# Patient Record
Sex: FEMALE | Race: WHITE | Employment: FULL TIME | URBAN - METROPOLITAN AREA
[De-identification: names, ages, dates, MRNs, and addresses within clinical notes are randomized per-mention and may not be internally consistent; named-entity substitution may affect disease eponyms.]

---

## 2018-11-01 LAB
A/G RATIO: 1.3 (ref 1.1–2.2)
ALBUMIN SERPL-MCNC: 4.3 G/DL (ref 3.4–5)
ALP BLD-CCNC: 64 U/L (ref 40–129)
ALT SERPL-CCNC: 15 U/L (ref 10–40)
ANION GAP SERPL CALCULATED.3IONS-SCNC: 14 MMOL/L (ref 3–16)
AST SERPL-CCNC: 20 U/L (ref 15–37)
BILIRUB SERPL-MCNC: 0.3 MG/DL (ref 0–1)
BUN BLDV-MCNC: 12 MG/DL (ref 7–20)
CALCIUM SERPL-MCNC: 9.5 MG/DL (ref 8.3–10.6)
CHLORIDE BLD-SCNC: 100 MMOL/L (ref 99–110)
CO2: 26 MMOL/L (ref 21–32)
CREAT SERPL-MCNC: 0.5 MG/DL (ref 0.6–1.1)
GFR AFRICAN AMERICAN: >60
GFR NON-AFRICAN AMERICAN: >60
GLOBULIN: 3.4 G/DL
GLUCOSE BLD-MCNC: 70 MG/DL (ref 70–99)
POTASSIUM SERPL-SCNC: 4.6 MMOL/L (ref 3.5–5.1)
SODIUM BLD-SCNC: 140 MMOL/L (ref 136–145)
TOTAL PROTEIN: 7.7 G/DL (ref 6.4–8.2)

## 2018-11-04 LAB — MISCELLANEOUS LAB TEST ORDER: NORMAL

## 2018-11-07 LAB — CALPROTECTIN: 536 UG/G

## 2019-02-12 LAB
QUANTI TB GOLD PLUS: NEGATIVE
QUANTI TB1 MINUS NIL: 0 IU/ML (ref 0–0.34)
QUANTI TB2 MINUS NIL: 0 IU/ML (ref 0–0.34)
QUANTIFERON MITOGEN: >10 IU/ML
QUANTIFERON NIL: 0.07 IU/ML

## 2019-09-27 RX ORDER — LISINOPRIL 20 MG/1
20 TABLET ORAL DAILY
COMMUNITY

## 2019-09-27 RX ORDER — NORGESTIMATE AND ETHINYL ESTRADIOL 7DAYSX3 28
KIT ORAL
COMMUNITY
End: 2022-05-17

## 2019-10-02 ENCOUNTER — ANESTHESIA EVENT (OUTPATIENT)
Dept: ENDOSCOPY | Age: 39
End: 2019-10-02
Payer: COMMERCIAL

## 2019-10-04 ENCOUNTER — ANESTHESIA (OUTPATIENT)
Dept: ENDOSCOPY | Age: 39
End: 2019-10-04
Payer: COMMERCIAL

## 2019-10-04 ENCOUNTER — HOSPITAL ENCOUNTER (OUTPATIENT)
Age: 39
Setting detail: OUTPATIENT SURGERY
Discharge: HOME | End: 2019-10-04
Attending: INTERNAL MEDICINE | Admitting: INTERNAL MEDICINE
Payer: COMMERCIAL

## 2019-10-04 VITALS
SYSTOLIC BLOOD PRESSURE: 114 MMHG | HEIGHT: 66 IN | HEART RATE: 78 BPM | OXYGEN SATURATION: 100 % | RESPIRATION RATE: 16 BRPM | WEIGHT: 129.8 LBS | DIASTOLIC BLOOD PRESSURE: 90 MMHG | TEMPERATURE: 97.4 F | BODY MASS INDEX: 20.86 KG/M2

## 2019-10-04 VITALS — SYSTOLIC BLOOD PRESSURE: 114 MMHG | OXYGEN SATURATION: 99 % | DIASTOLIC BLOOD PRESSURE: 76 MMHG

## 2019-10-04 DIAGNOSIS — K50.919 CROHN'S DISEASE WITH COMPLICATION, UNSPECIFIED GASTROINTESTINAL TRACT LOCATION (HCC): ICD-10-CM

## 2019-10-04 LAB — PREGNANCY, URINE: NEGATIVE

## 2019-10-04 PROCEDURE — 2500000003 HC RX 250 WO HCPCS: Performed by: NURSE ANESTHETIST, CERTIFIED REGISTERED

## 2019-10-04 PROCEDURE — 7100000011 HC PHASE II RECOVERY - ADDTL 15 MIN: Performed by: INTERNAL MEDICINE

## 2019-10-04 PROCEDURE — 84703 CHORIONIC GONADOTROPIN ASSAY: CPT

## 2019-10-04 PROCEDURE — 2709999900 HC NON-CHARGEABLE SUPPLY: Performed by: INTERNAL MEDICINE

## 2019-10-04 PROCEDURE — 7100000010 HC PHASE II RECOVERY - FIRST 15 MIN: Performed by: INTERNAL MEDICINE

## 2019-10-04 PROCEDURE — 88305 TISSUE EXAM BY PATHOLOGIST: CPT

## 2019-10-04 PROCEDURE — 3700000000 HC ANESTHESIA ATTENDED CARE: Performed by: INTERNAL MEDICINE

## 2019-10-04 PROCEDURE — 2580000003 HC RX 258: Performed by: ANESTHESIOLOGY

## 2019-10-04 PROCEDURE — 6360000002 HC RX W HCPCS: Performed by: NURSE ANESTHETIST, CERTIFIED REGISTERED

## 2019-10-04 PROCEDURE — 3609010300 HC COLONOSCOPY W/BIOPSY SINGLE/MULTIPLE: Performed by: INTERNAL MEDICINE

## 2019-10-04 PROCEDURE — 3700000001 HC ADD 15 MINUTES (ANESTHESIA): Performed by: INTERNAL MEDICINE

## 2019-10-04 RX ORDER — SODIUM CHLORIDE 0.9 % (FLUSH) 0.9 %
10 SYRINGE (ML) INJECTION PRN
Status: DISCONTINUED | OUTPATIENT
Start: 2019-10-04 | End: 2019-10-04 | Stop reason: HOSPADM

## 2019-10-04 RX ORDER — SODIUM CHLORIDE 9 MG/ML
INJECTION, SOLUTION INTRAVENOUS CONTINUOUS
Status: DISCONTINUED | OUTPATIENT
Start: 2019-10-04 | End: 2019-10-04 | Stop reason: HOSPADM

## 2019-10-04 RX ORDER — SODIUM CHLORIDE 0.9 % (FLUSH) 0.9 %
10 SYRINGE (ML) INJECTION EVERY 12 HOURS SCHEDULED
Status: DISCONTINUED | OUTPATIENT
Start: 2019-10-04 | End: 2019-10-04 | Stop reason: HOSPADM

## 2019-10-04 RX ORDER — ONDANSETRON 2 MG/ML
4 INJECTION INTRAMUSCULAR; INTRAVENOUS
Status: DISCONTINUED | OUTPATIENT
Start: 2019-10-04 | End: 2019-10-04 | Stop reason: HOSPADM

## 2019-10-04 RX ORDER — PROPOFOL 10 MG/ML
INJECTION, EMULSION INTRAVENOUS PRN
Status: DISCONTINUED | OUTPATIENT
Start: 2019-10-04 | End: 2019-10-04 | Stop reason: SDUPTHER

## 2019-10-04 RX ORDER — LIDOCAINE HYDROCHLORIDE 20 MG/ML
INJECTION, SOLUTION EPIDURAL; INFILTRATION; INTRACAUDAL; PERINEURAL PRN
Status: DISCONTINUED | OUTPATIENT
Start: 2019-10-04 | End: 2019-10-04 | Stop reason: SDUPTHER

## 2019-10-04 RX ADMIN — PROPOFOL 20 MG: 10 INJECTION, EMULSION INTRAVENOUS at 10:32

## 2019-10-04 RX ADMIN — PROPOFOL 10 MG: 10 INJECTION, EMULSION INTRAVENOUS at 10:43

## 2019-10-04 RX ADMIN — PROPOFOL 40 MG: 10 INJECTION, EMULSION INTRAVENOUS at 10:38

## 2019-10-04 RX ADMIN — PROPOFOL 10 MG: 10 INJECTION, EMULSION INTRAVENOUS at 10:28

## 2019-10-04 RX ADMIN — PROPOFOL 30 MG: 10 INJECTION, EMULSION INTRAVENOUS at 10:41

## 2019-10-04 RX ADMIN — PROPOFOL 30 MG: 10 INJECTION, EMULSION INTRAVENOUS at 10:29

## 2019-10-04 RX ADMIN — SODIUM CHLORIDE: 0.9 INJECTION, SOLUTION INTRAVENOUS at 09:25

## 2019-10-04 RX ADMIN — LIDOCAINE HYDROCHLORIDE 60 MG: 20 INJECTION, SOLUTION EPIDURAL; INFILTRATION; INTRACAUDAL; PERINEURAL at 10:25

## 2019-10-04 RX ADMIN — PROPOFOL 20 MG: 10 INJECTION, EMULSION INTRAVENOUS at 10:26

## 2019-10-04 RX ADMIN — PROPOFOL 30 MG: 10 INJECTION, EMULSION INTRAVENOUS at 10:27

## 2019-10-04 RX ADMIN — PROPOFOL 20 MG: 10 INJECTION, EMULSION INTRAVENOUS at 10:44

## 2019-10-04 RX ADMIN — PROPOFOL 50 MG: 10 INJECTION, EMULSION INTRAVENOUS at 10:35

## 2019-10-04 RX ADMIN — PROPOFOL 60 MG: 10 INJECTION, EMULSION INTRAVENOUS at 10:25

## 2019-10-04 RX ADMIN — PROPOFOL 30 MG: 10 INJECTION, EMULSION INTRAVENOUS at 10:31

## 2019-10-04 ASSESSMENT — PAIN SCALES - GENERAL
PAINLEVEL_OUTOF10: 0

## 2019-10-04 ASSESSMENT — PAIN - FUNCTIONAL ASSESSMENT: PAIN_FUNCTIONAL_ASSESSMENT: 0-10

## 2019-11-01 LAB
A/G RATIO: 1 (ref 1.1–2.2)
ALBUMIN SERPL-MCNC: 4.1 G/DL (ref 3.4–5)
ALP BLD-CCNC: 67 U/L (ref 40–129)
ALT SERPL-CCNC: 7 U/L (ref 10–40)
ANION GAP SERPL CALCULATED.3IONS-SCNC: 15 MMOL/L (ref 3–16)
AST SERPL-CCNC: 20 U/L (ref 15–37)
BASOPHILS ABSOLUTE: 0.1 K/UL (ref 0–0.2)
BASOPHILS RELATIVE PERCENT: 1 %
BILIRUB SERPL-MCNC: <0.2 MG/DL (ref 0–1)
BUN BLDV-MCNC: 10 MG/DL (ref 7–20)
CALCIUM SERPL-MCNC: 9.5 MG/DL (ref 8.3–10.6)
CHLORIDE BLD-SCNC: 97 MMOL/L (ref 99–110)
CO2: 24 MMOL/L (ref 21–32)
CREAT SERPL-MCNC: 0.5 MG/DL (ref 0.6–1.1)
EOSINOPHILS ABSOLUTE: 0.2 K/UL (ref 0–0.6)
EOSINOPHILS RELATIVE PERCENT: 3.1 %
GFR AFRICAN AMERICAN: >60
GFR NON-AFRICAN AMERICAN: >60
GLOBULIN: 4 G/DL
GLUCOSE BLD-MCNC: 89 MG/DL (ref 70–99)
HCT VFR BLD CALC: 34 % (ref 36–48)
HEMOGLOBIN: 11.5 G/DL (ref 12–16)
LYMPHOCYTES ABSOLUTE: 2.3 K/UL (ref 1–5.1)
LYMPHOCYTES RELATIVE PERCENT: 33.4 %
MCH RBC QN AUTO: 30.8 PG (ref 26–34)
MCHC RBC AUTO-ENTMCNC: 33.8 G/DL (ref 31–36)
MCV RBC AUTO: 91.1 FL (ref 80–100)
MONOCYTES ABSOLUTE: 0.6 K/UL (ref 0–1.3)
MONOCYTES RELATIVE PERCENT: 8.5 %
NEUTROPHILS ABSOLUTE: 3.8 K/UL (ref 1.7–7.7)
NEUTROPHILS RELATIVE PERCENT: 54 %
PDW BLD-RTO: 12.8 % (ref 12.4–15.4)
PLATELET # BLD: 428 K/UL (ref 135–450)
PMV BLD AUTO: 6.7 FL (ref 5–10.5)
POTASSIUM SERPL-SCNC: 4.2 MMOL/L (ref 3.5–5.1)
RBC # BLD: 3.73 M/UL (ref 4–5.2)
SODIUM BLD-SCNC: 136 MMOL/L (ref 136–145)
TOTAL PROTEIN: 8.1 G/DL (ref 6.4–8.2)
WBC # BLD: 7 K/UL (ref 4–11)

## 2020-10-28 LAB
BASOPHILS ABSOLUTE: 0 K/UL (ref 0–0.2)
BASOPHILS RELATIVE PERCENT: 0.4 %
C-REACTIVE PROTEIN: 36.3 MG/L (ref 0–5.1)
CALCIUM SERPL-MCNC: 9.5 MG/DL (ref 8.3–10.6)
EOSINOPHILS ABSOLUTE: 0 K/UL (ref 0–0.6)
EOSINOPHILS RELATIVE PERCENT: 0.7 %
FOLATE: >20 NG/ML (ref 4.78–24.2)
HCT VFR BLD CALC: 35.3 % (ref 36–48)
HEMOGLOBIN: 11.8 G/DL (ref 12–16)
LYMPHOCYTES ABSOLUTE: 2 K/UL (ref 1–5.1)
LYMPHOCYTES RELATIVE PERCENT: 31.9 %
MAGNESIUM: 2.3 MG/DL (ref 1.8–2.4)
MCH RBC QN AUTO: 29.8 PG (ref 26–34)
MCHC RBC AUTO-ENTMCNC: 33.3 G/DL (ref 31–36)
MCV RBC AUTO: 89.4 FL (ref 80–100)
MONOCYTES ABSOLUTE: 0.5 K/UL (ref 0–1.3)
MONOCYTES RELATIVE PERCENT: 8 %
NEUTROPHILS ABSOLUTE: 3.7 K/UL (ref 1.7–7.7)
NEUTROPHILS RELATIVE PERCENT: 59 %
PDW BLD-RTO: 14.7 % (ref 12.4–15.4)
PLATELET # BLD: 461 K/UL (ref 135–450)
PMV BLD AUTO: 6.5 FL (ref 5–10.5)
RBC # BLD: 3.95 M/UL (ref 4–5.2)
VITAMIN B-12: 1120 PG/ML (ref 211–911)
WBC # BLD: 6.4 K/UL (ref 4–11)

## 2020-10-29 LAB — SEDIMENTATION RATE, ERYTHROCYTE: 103 MM/HR (ref 0–20)

## 2020-10-31 LAB — MISCELLANEOUS LAB TEST ORDER: NORMAL

## 2020-11-03 PROBLEM — K50.919 CROHN'S DISEASE WITH COMPLICATION (HCC): Status: ACTIVE | Noted: 2020-11-03

## 2020-11-03 RX ORDER — SODIUM CHLORIDE 0.9 % (FLUSH) 0.9 %
30 SYRINGE (ML) INJECTION ONCE
Status: CANCELLED | OUTPATIENT
Start: 2020-11-10

## 2020-11-03 RX ORDER — DIPHENHYDRAMINE HYDROCHLORIDE 50 MG/ML
50 INJECTION INTRAMUSCULAR; INTRAVENOUS ONCE
Status: CANCELLED | OUTPATIENT
Start: 2020-11-10

## 2020-11-03 RX ORDER — METHYLPREDNISOLONE SODIUM SUCCINATE 125 MG/2ML
125 INJECTION, POWDER, LYOPHILIZED, FOR SOLUTION INTRAMUSCULAR; INTRAVENOUS ONCE
Status: CANCELLED | OUTPATIENT
Start: 2020-11-10

## 2020-11-03 RX ORDER — SODIUM CHLORIDE 9 MG/ML
INJECTION, SOLUTION INTRAVENOUS CONTINUOUS
Status: CANCELLED | OUTPATIENT
Start: 2020-11-10

## 2020-11-03 RX ORDER — SODIUM CHLORIDE 0.9 % (FLUSH) 0.9 %
10 SYRINGE (ML) INJECTION PRN
Status: CANCELLED | OUTPATIENT
Start: 2020-11-10

## 2020-11-03 RX ORDER — EPINEPHRINE 1 MG/ML
0.3 INJECTION, SOLUTION, CONCENTRATE INTRAVENOUS PRN
Status: CANCELLED | OUTPATIENT
Start: 2020-11-10

## 2020-11-05 LAB — MISCELLANEOUS LAB TEST ORDER: NORMAL

## 2020-12-01 ENCOUNTER — HOSPITAL ENCOUNTER (OUTPATIENT)
Dept: PHYSICAL THERAPY | Age: 40
Setting detail: THERAPIES SERIES
Discharge: HOME | End: 2020-12-01
Payer: COMMERCIAL

## 2020-12-01 PROCEDURE — 97110 THERAPEUTIC EXERCISES: CPT

## 2020-12-01 PROCEDURE — 97530 THERAPEUTIC ACTIVITIES: CPT

## 2020-12-01 PROCEDURE — 97162 PT EVAL MOD COMPLEX 30 MIN: CPT

## 2020-12-01 NOTE — PLAN OF CARE
Outpatient Physical Therapy  [] [facility]    Phone: [phone]   Fax: [phone]   [] [facility]  Phone: [phone]              Fax: [phone]  [x] Francine Baca   Phone: [phone]   Fax: [phone]     To: Referring Practitioner: DAVID Hernandez-SAM      Patient: Jim Bonilla   : 1980   MRN: [id number]  Evaluation Date: 2020      Diagnosis Information:  · Diagnosis: Chronic Neck Pain M54.2, G89.29   · Treatment Diagnosis: Neck Pain, Muscle Imbalance     Physical Therapy Certification Form  Dear Matias Rao,  The following patient has been evaluated for physical therapy services and for therapy to continue, Medicare requires monthly physician review of the treatment plan. Please review the attached evaluation and/or summary of the patient's plan of care, and verify that you agree therapy should continue by signing the attached document and sending it back to our office. Plan of Care/Treatment to date:  [x] Therapeutic Exercise    [x] Modalities:  [x] Therapeutic Activity     [] Ultrasound  [] Electrical Stimulation  [] Gait Training      [] Cervical Traction [] Lumbar Traction  [] Neuromuscular Re-education    [] Cold/hotpack [] Iontophoresis   [x] Instruction in HEP     Other:  [x] Manual Therapy      []             [] Aquatic Therapy      []           ? Frequency/Duration:  # Days per week: [] 1 day # Weeks: [] 1 week [] 5 weeks     [x] 2 days? [] 2 weeks [x] 6 weeks     [] 3 days   [] 3 weeks [] 7 weeks     [] 4 days   [] 4 weeks [] 8 weeks    Rehab Potential: [] Excellent [x] Good [] Fair  [] Poor       Electronically signed by:  Teodoro Li, PT [id number]      If you have any questions or concerns, please don't hesitate to call.   Thank you for your referral.      Physician Signature:________________________________Date:__________________  By signing above, therapists plan is approved by physician

## 2020-12-01 NOTE — FLOWSHEET NOTE
Physical Therapy Daily Treatment Note  Date:  2020    Patient Name:  Serina Ellis    :  1980  MRN: [id number]    Restrictions/Precautions:  Restrictions/Precautions  Restrictions/Precautions: Fall Risk(Low)  Pertinent Medical History:      Medical/Treatment Diagnosis Information:  · Diagnosis: Chronic Neck Pain M54.2, G89.29  · Treatment Diagnosis: Neck Pain, Muscle Imbalance    Insurance/Certification information:  PT Insurance Information: [employer], allowed 60 PT visits per contract year, $40 copay  Physician Information:  Referring Practitioner: ILAN Alvarado  Plan of care signed (Y/N): Faxed 20    Visit# / total visits:    Pain level: 50     Functional Outcomes Measure:  Test: Neck Disability Index  Score:8 = 16% Disability (20)     Progress Note: [x]  Yes  []  No  Next due by: Visit #10      History of Injury:Patient reports she has had neck pain for several years; 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 Patient has difficulty sleeping due to pain and anxiety, has stiffness in am, as the day goes on pain increases. Patient is working from home, 9 hours per day, 5 days per week, tries to take a break every 1-2 hours. Patient reports pain begins to increase after 3-4 hours working, climbs through the day, and is pretty uncomfortable by the end of the day. Patient does stretches, walking 30 minutes on treadmill. Patient does get some relief, but not complete. \"When things get really tight I get numbness and weakness in my L hand, especially the little finger. \"  Patient reports she applies heat, then it does relax some.     Subjective:       Objective:   Observation:    Test measurements:      Exercises:  Exercise/Equipment Resistance/Repetitions Other comments                                                                            Other Therapeutic Activities:  12/1/20:  Discussed at length anatomy and biomechanics of the neck, muscle reeducation, motor recruitment. Home Exercise Program:  12/1/20:   Patient instructed in chin tucks, lower trap sets, forward bending, backward bending, rotation R and L, sidebending R and L; written instructions with pictures issued, patient able to demonstrate exercises. Manual Treatments:     Modalities:     Progression Towards Functional goals:  [] Patient is progressing as expected towards functional goals listed. [] Progression is slowed due to complexities listed. [] Progression has been slowed due to co-morbidities. [x] Plan just implemented, too soon to assess goals progression  [] Other:    Charges: Therapeutic Exercise:  [x] (31204) Provided verbal/tactile cueing for activities to restore or maintain strength, flexibility, endurance, ROM for improvements with self-care, mobility, lifting and ambulation. Neuromuscular Re-Education  [] (70951) Provided verbal/tactile cueing for activities to restore or maintain balance, coordination, kinesthetic sense, posture, motor skill, proprioception for self-care, mobility, lifting, and ambulation. Therapeutic Activities:    [x] (93399) Provided verbal/tactile cueing to address functional limitations related to loss of mobility, strength, balance, and coordination.      Gait Training:  [] (65010) Provided training and instruction to the patient for proper postural muscle recruitment and positioning with ambulation re-education     Home Exercise Program:    [] (20817) Reviewed/Progressed HEP activities related to strengthening, flexibility, endurance, ROM for functional self-care, mobility, lifting and ambulation   [] (73223) Reviewed/Progressed HEP activities related to improving balance, coordination, kinesthetic sense, posture, motor skill, proprioception for self-care, mobility, lifting, and ambulation      Manual Treatments:  Luann De Jesus / Lazarus Rash / Oscillations-Mobs: G-I, II, III, IV / Manipulation / MLD  [] (48919) Provided manual therapy to mobilize  soft tissue/joints/fluid for the purpose of modulating pain, promoting relaxation, increasing ROM, reducing/eliminating soft tissue swelling/inflammation/restriction, improving soft tissue extensibility and allowing for proper ROM for normal function with self- care, mobility, lifting and ambulation. Timed Code Treatment Minutes: 30   Total Treatment Minutes: 55     [] EVAL (LOW) 24176   [x] EVAL (MOD) 68926   [] EVAL (HIGH) 00688   [] RE-EVAL   [x] TE (95094) x     [] Aquatic (02437) x  [] NMR (46455)   x  [] Aquatic Group (75121) x  [] Manual (28824) x    [] Ultrasound (78171) x  [x] TA (13369) x  [] Mech Traction (81778)  [] Ionto (35979)           [] ES (un) (03567):   [] Vasopump (97209) [] Other:      Assessment  [x] Patient tolerated treatment well [] Patient limited by fatigue  [] Patient limited by pain  [] Patient limited by other medical complications  [] Other:     Prognosis: [x] Good [] Fair  [] Poor    Goals:    Short term goals  Time Frame for Short term goals: 4-6 weeks  Short term goal 1: Pain </= 0/10 at rest, 2/10 with activity  Short term goal 2: Patient able to demonstrate neck AROM WNL without symptoms  Short term goal 3: Patient able to sleep thru night without waking due to neck pain  Short term goal 4: Patient able to work entire day without increased symptoms  Short term goal 5: Patient independent with written home exercise program            Patient Requires Follow-up: [x] Yes  [] No    Plan:   [] Continue per plan of care [] Alter current plan (see comments)  [x] Plan of care initiated [] Hold pending MD visit [] Discharge  Note: If patient does not return for scheduled/ recommended follow up visits, this note will serve as a discharge from care along with most recent update on progress.     Plan for Next Session:  Initiate UE [place], Theraslide lat pull down, row, extension (yellow band), soft

## 2020-12-01 NOTE — PROGRESS NOTES
Physical Therapy  Initial Assessment  Date: 2020  Patient Name: Yaakov Bee  MRN: [id number]  : 1980     Treatment Diagnosis: Neck Pain, Muscle Imbalance    Restrictions  Restrictions/Precautions: Fall Risk(Low)    Subjective   Chart Reviewed: Yes  Patient assessed for rehabilitation services?: Yes  Referring Practitioner: ILAN David  Referral Date : 20  Diagnosis: Chronic Neck Pain M54.2, G89.29  PT Visit Information  Onset Date: (Several years)  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 Patient has difficulty sleeping due to pain and anxiety, has stiffness in am, as the day goes on pain increases. Patient is working from home, 9 hours per day, 5 days per week, tries to take a break every 1-2 hours. Patient reports pain begins to increase after 3-4 hours working, climbs through the day, and is pretty uncomfortable by the end of the day. Patient does stretches, walking 30 minutes on treadmill. Patient does get some relief, but not complete. \"When things get really tight I get numbness and weakness in my L hand, especially the little finger. \"  Patient reports she applies heat, then it does relax some.   Pain: (5/10 on average, ranges from 1-2/10 almost all the time, 8-9/10 after sitting at computer for too long)    Vision/Hearing  Vision: Within Functional Limits  Hearing: Within functional limits    Orientation  Overall Orientation Status: Within Normal Limits    Social/Functional History  Lives With: Significant other  Type of Home: House  Home Layout: Two level;Bed/Bath upstairs  Home Access: Stairs to enter without rails  Entrance Stairs - Number of Steps: 1  Bathroom Shower/Tub: Walk-in shower  Bathroom Toilet: Standard  ADL Assistance: Independent  Homemaking Assistance: Independent  Active : Yes  Occupation: Full time employment  Type of occupation:     Objective  Posture: Fair(slight forward head, rounded shoulders; able to correct with cues)  Palpation: tenderness and mod/max tightness noted L>R lev scap, scalenes, SCM, upper traps, rhomboids, C-T paraspinals  Body Mechanics: mild scapular winging L shoulder during ECC shoulder elevation  RUE AROM : WFL  LUE AROM : WFL  Spine  Cervical: Forward Bending, Backward Bending, Rotation L and Sidebending L WNL but reports \"tight\" at end range; Rotation R and Sidebending R decreased 10%    Strength Other Gross MMT B UEs 5/5, however Fair control of lower traps, neck retractors with isometric contraction and relaxation  Overall Sensation Status: WFL(B UEs intact to light touch)       Assessment   Conditions Requiring Skilled Therapeutic Intervention  Body structures, Functions, Activity limitations: Decreased functional mobility ; Decreased high-level IADLs;Decreased posture;Decreased endurance;Decreased ROM; Decreased strength; Increased pain  Assessment: Patient presents with decreased functional mobility consistent with chronic neck pain and muscle imbalance. patient would benefit from PT to increase functional mobility.     Prior Level of FunctioN:  Independent  Treatment Diagnosis: Neck Pain, Muscle Imbalance  Prognosis: Good  Decision Making: Medium Complexity  Activity Tolerance  Activity Tolerance: Patient Tolerated treatment well         Plan : Patient will be seen 1-3 times per week for 4-6 weeks  Current Treatment Recommendations: Strengthening, Patient/Caregiver Education & Training, ROM, Modalities, Neuromuscular Re-education, Pain Management, Endurance Training, Manual Therapy - Soft Tissue Mobilization, Home Exercise Program    OutComes Score  Neck Disability Index Raw Score: 8 (12/01/20 1187)    Goals  Short term goals  Time Frame for Short term goals: 4-6 weeks  Short term goal 1: Pain </= 0/10 at rest, 2/10 with activity  Short term goal 2: Patient able to demonstrate neck AROM WNL without symptoms  Short term goal 3: Patient able to sleep thru night without waking due to neck pain  Short term goal 4: Patient able to work entire day without increased symptoms  Short term goal 5: Patient independent with written home exercise program  Patient Goals   Patient goals :  \"Reduce pain\"       Therapy Time   Individual Concurrent Group Co-treatment   Time In 1450         Time Out 1550         Minutes 60         Timed Code Treatment Minutes: [address], [address] [facility]

## 2020-12-04 ENCOUNTER — HOSPITAL ENCOUNTER (OUTPATIENT)
Dept: PHYSICAL THERAPY | Age: 40
Setting detail: THERAPIES SERIES
Discharge: HOME | End: 2020-12-04
Payer: COMMERCIAL

## 2020-12-04 PROCEDURE — 97110 THERAPEUTIC EXERCISES: CPT

## 2020-12-04 PROCEDURE — 97140 MANUAL THERAPY 1/> REGIONS: CPT

## 2020-12-04 NOTE — FLOWSHEET NOTE
Physical Therapy Daily Treatment Note  Date:  2020    Patient Name:  Jim Bonilla    :  1980  MRN: [id number]    Restrictions/Precautions:  Restrictions/Precautions  Restrictions/Precautions: Fall Risk(Low)  Pertinent Medical History:      Medical/Treatment Diagnosis Information:  · Diagnosis: Chronic Neck Pain M54.2, G89.29  · Treatment Diagnosis: Neck Pain, Muscle Imbalance    Insurance/Certification information:  [facility], allowed 60 PT visits per contract year, $40 copay  Physician Information:  Referring Practitioner: ILAN Hernandez  Plan of care signed (Y/N): Faxed 20    Visit# / total visits:    Pain level: 50     Functional Outcomes Measure:  Test: Neck Disability Index  Score:8 = 16% Disability (20)     Progress Note: [x]  Yes  []  No  Next due by: Visit #10      History of Injury:Patient reports she has had neck pain for several years; 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 Patient has difficulty sleeping due to pain and anxiety, has stiffness in am, as the day goes on pain increases. Patient is working from home, 9 hours per day, 5 days per week, tries to take a break every 1-2 hours. Patient reports pain begins to increase after 3-4 hours working, climbs through the day, and is pretty uncomfortable by the end of the day. Patient does stretches, walking 30 minutes on treadmill. Patient does get some relief, but not complete. \"When things get really tight I get numbness and weakness in my L hand, especially the little finger. \"  Patient reports she applies heat, then it does relax some. Subjective:     20: Did pretty well following eval. Not too bad today.  Some tightness in posterior neck and L shoulder    Objective:   Observation:    Test measurements:      Exercises:  Exercise/Equipment Resistance/Repetitions Other comments        UE [place] flexion 10x R/L    Tslide:  Row               Ext               Lat pull Yellow 10x  Yellow 10x  Yellow 10x                                                              Other Therapeutic Activities:  12/1/20:  Discussed at length anatomy and biomechanics of the neck, muscle reeducation, motor recruitment. Home Exercise Program:  12/1/20:   Patient instructed in chin tucks, lower trap sets, forward bending, backward bending, rotation R and L, sidebending R and L; written instructions with pictures issued, patient able to demonstrate exercises. Manual Treatments: Soft tissue mobilization to B cervical regions, UT and LS; suboccipital release    Modalities:     Progression Towards Functional goals:  [] Patient is progressing as expected towards functional goals listed. [] Progression is slowed due to complexities listed. [] Progression has been slowed due to co-morbidities. [x] Plan just implemented, too soon to assess goals progression  [] Other:    Charges: Therapeutic Exercise:  [x] (45741) Provided verbal/tactile cueing for activities to restore or maintain strength, flexibility, endurance, ROM for improvements with self-care, mobility, lifting and ambulation. Neuromuscular Re-Education  [] (68202) Provided verbal/tactile cueing for activities to restore or maintain balance, coordination, kinesthetic sense, posture, motor skill, proprioception for self-care, mobility, lifting, and ambulation. Therapeutic Activities:    [x] (00943) Provided verbal/tactile cueing to address functional limitations related to loss of mobility, strength, balance, and coordination.      Gait Training:  [] (41479) Provided training and instruction to the patient for proper postural muscle recruitment and positioning with ambulation re-education     Home Exercise Program:    [] (21566) Reviewed/Progressed HEP activities related to strengthening, flexibility, endurance, ROM for functional visit [] Discharge  Note: If patient does not return for scheduled/ recommended follow up visits, this note will serve as a discharge from care along with most recent update on progress. Plan for Next Session:  ISoft tissue mob to B cervical region.       Electronically signed by:  Raghav Wynn PTA

## 2020-12-07 ENCOUNTER — HOSPITAL ENCOUNTER (OUTPATIENT)
Dept: PHYSICAL THERAPY | Age: 40
Setting detail: THERAPIES SERIES
Discharge: HOME | End: 2020-12-07
Payer: COMMERCIAL

## 2020-12-07 PROCEDURE — 97110 THERAPEUTIC EXERCISES: CPT

## 2020-12-07 PROCEDURE — 97140 MANUAL THERAPY 1/> REGIONS: CPT

## 2020-12-07 NOTE — FLOWSHEET NOTE
Physical Therapy Daily Treatment Note  Date:  2020    Patient Name:  Kota Leyva    :  1980  MRN: [id number]    Restrictions/Precautions:  Restrictions/Precautions  Restrictions/Precautions: Fall Risk(Low)  Pertinent Medical History:      Medical/Treatment Diagnosis Information:  · Diagnosis: Chronic Neck Pain M54.2, G89.29  · Treatment Diagnosis: Neck Pain, Muscle Imbalance    Insurance/Certification information:  PT Insurance Information: [employer], allowed 60 PT visits per contract year, $40 copay  Physician Information:  Referring Practitioner: ILAN Beard  Plan of care signed (Y/N): Faxed 20    Visit# / total visits:  3/12  Pain level: 5/10     Functional Outcomes Measure:  Test: Neck Disability Index  Score:8 = 16% Disability (20)     Progress Note: [x]  Yes  []  No  Next due by: Visit #10      History of Injury:Patient reports she has had neck pain for several years; 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 Patient has difficulty sleeping due to pain and anxiety, has stiffness in am, as the day goes on pain increases. Patient is working from home, 9 hours per day, 5 days per week, tries to take a break every 1-2 hours. Patient reports pain begins to increase after 3-4 hours working, climbs through the day, and is pretty uncomfortable by the end of the day. Patient does stretches, walking 30 minutes on treadmill. Patient does get some relief, but not complete. \"When things get really tight I get numbness and weakness in my L hand, especially the little finger. \"  Patient reports she applies heat, then it does relax some. Subjective:     20: Did pretty well following eval. Not too bad today.  Some tightness in posterior neck and L shoulder  20:  Patient reports neck is doing a little better, still is sore at the end of the day but the HEP is helping  Objective:   Observation: Mod tightness noted R upper trap, lev scap, SCM, scalenes; min tightness noted L upper trap, lev scap, SCM, scalenes.  Test measurements:      Exercises:  Exercise/Equipment Resistance/Repetitions Other comments        UE [place] flexion 10x R/L    Tslide:  Row               Ext               Lat pull Yellow 10x  Yellow 10x  Yellow 10x                                                              Other Therapeutic Activities:  12/1/20:  Discussed at length anatomy and biomechanics of the neck, muscle reeducation, motor recruitment. Home Exercise Program:  12/1/20:   Patient instructed in chin tucks, lower trap sets, forward bending, backward bending, rotation R and L, sidebending R and L; written instructions with pictures issued, patient able to demonstrate exercises. 12/7/20:   Patient instructed in wall slide with step, prone neck retraction; written instructions with pictures issued, patient able to demonstrate exercises. Manual Treatments: Soft tissue mobilization to B cervical regions, with focus on upper traps, lev scap, SCM, scalenes; suboccipital release x 30 minutes total    Modalities:     Progression Towards Functional goals:  [x] Patient is progressing as expected towards functional goals listed. [] Progression is slowed due to complexities listed. [] Progression has been slowed due to co-morbidities. [] Plan just implemented, too soon to assess goals progression  [] Other:    Charges: Therapeutic Exercise:  [x] (09864) Provided verbal/tactile cueing for activities to restore or maintain strength, flexibility, endurance, ROM for improvements with self-care, mobility, lifting and ambulation. Neuromuscular Re-Education  [] (26462) Provided verbal/tactile cueing for activities to restore or maintain balance, coordination, kinesthetic sense, posture, motor skill, proprioception for self-care, mobility, lifting, and ambulation.      Therapeutic Activities:    [] (63990) Provided verbal/tactile cueing to address functional limitations related to loss of mobility, strength, balance, and coordination. Gait Training:  [] (02323) Provided training and instruction to the patient for proper postural muscle recruitment and positioning with ambulation re-education     Home Exercise Program:    [x] (29508) Reviewed/Progressed HEP activities related to strengthening, flexibility, endurance, ROM for functional self-care, mobility, lifting and ambulation   [] (86437) Reviewed/Progressed HEP activities related to improving balance, coordination, kinesthetic sense, posture, motor skill, proprioception for self-care, mobility, lifting, and ambulation      Manual Treatments:  MFR / STM / Oscillations-Mobs:  G-I, II, III, IV / Manipulation / MLD  [x] (57133) Provided manual therapy to mobilize  soft tissue/joints/fluid for the purpose of modulating pain, promoting relaxation, increasing ROM, reducing/eliminating soft tissue swelling/inflammation/restriction, improving soft tissue extensibility and allowing for proper ROM for normal function with self- care, mobility, lifting and ambulation.         Timed Code Treatment Minutes: 45   Total Treatment Minutes: 45     [] EVAL (LOW) 77148   [] EVAL (MOD) 08192   [] EVAL (HIGH) 37867   [] RE-EVAL   [x] TE (67514) x     [] Aquatic (75592) x  [] NMR (83117)   x  [] Aquatic Group (13232) x  [x] Manual (37395) x 2   [] Ultrasound (70351) x  [] TA (19841) x  [] Mech Traction (41553)  [] Ionto (84172)           [] ES (un) (31646):   [] Vasopump (67561) [] Other:      Assessment  [x] Patient tolerated treatment well [] Patient limited by fatigue  [] Patient limited by pain  [] Patient limited by other medical complications  [] Other:     Prognosis: [x] Good [] Fair  [] Poor    Goals:    Short term goals  Time Frame for Short term goals: 4-6 weeks  Short term goal 1: Pain </= 0/10 at rest, 2/10 with activity  Short term goal 2: Patient able to demonstrate neck AROM WNL without symptoms  Short term goal 3: Patient able to sleep thru night without waking due to neck pain  Short term goal 4: Patient able to work entire day without increased symptoms  Short term goal 5: Patient independent with written home exercise program            Patient Requires Follow-up: [x] Yes  [] No    Plan:   [] Continue per plan of care [] Alter current plan (see comments)  [x] Plan of care initiated [] Hold pending MD visit [] Discharge  Note: If patient does not return for scheduled/ recommended follow up visits, this note will serve as a discharge from care along with most recent update on progress. Plan for Next Session:  Monitor response. Instruct patient in lev scap stretch, doorway stretch for HEP.     Electronically signed by:  Josesito Min, [address] [facility] lisinopril 20mg patient takes at home.

## 2020-12-08 ENCOUNTER — APPOINTMENT (OUTPATIENT)
Dept: PHYSICAL THERAPY | Age: 40
End: 2020-12-08
Payer: COMMERCIAL

## 2020-12-10 ENCOUNTER — HOSPITAL ENCOUNTER (OUTPATIENT)
Dept: INFUSION THERAPY | Age: 40
Setting detail: INFUSION SERIES
Discharge: HOME | End: 2020-12-10
Payer: COMMERCIAL

## 2020-12-10 DIAGNOSIS — K50.919 CROHN'S DISEASE WITH COMPLICATION, UNSPECIFIED GASTROINTESTINAL TRACT LOCATION (HCC): Primary | ICD-10-CM

## 2020-12-10 RX ORDER — SODIUM CHLORIDE 0.9 % (FLUSH) 0.9 %
30 SYRINGE (ML) INJECTION ONCE
Status: CANCELLED | OUTPATIENT
Start: 2020-12-24

## 2020-12-10 RX ORDER — EPINEPHRINE 1 MG/ML
0.3 INJECTION, SOLUTION, CONCENTRATE INTRAVENOUS PRN
Status: CANCELLED | OUTPATIENT
Start: 2020-12-24

## 2020-12-10 RX ORDER — SODIUM CHLORIDE 9 MG/ML
INJECTION, SOLUTION INTRAVENOUS CONTINUOUS
Status: CANCELLED | OUTPATIENT
Start: 2020-12-24

## 2020-12-10 RX ORDER — SODIUM CHLORIDE 0.9 % (FLUSH) 0.9 %
30 SYRINGE (ML) INJECTION ONCE
Status: DISCONTINUED | OUTPATIENT
Start: 2020-12-10 | End: 2020-12-13 | Stop reason: HOSPADM

## 2020-12-10 RX ORDER — DIPHENHYDRAMINE HYDROCHLORIDE 50 MG/ML
50 INJECTION INTRAMUSCULAR; INTRAVENOUS ONCE
Status: CANCELLED | OUTPATIENT
Start: 2020-12-24

## 2020-12-10 RX ORDER — SODIUM CHLORIDE 0.9 % (FLUSH) 0.9 %
10 SYRINGE (ML) INJECTION PRN
Status: CANCELLED | OUTPATIENT
Start: 2020-12-24

## 2020-12-10 RX ORDER — METHYLPREDNISOLONE SODIUM SUCCINATE 125 MG/2ML
125 INJECTION, POWDER, LYOPHILIZED, FOR SOLUTION INTRAMUSCULAR; INTRAVENOUS ONCE
Status: CANCELLED | OUTPATIENT
Start: 2020-12-24

## 2020-12-11 ENCOUNTER — HOSPITAL ENCOUNTER (OUTPATIENT)
Dept: PHYSICAL THERAPY | Age: 40
Setting detail: THERAPIES SERIES
Discharge: HOME | End: 2020-12-11
Payer: COMMERCIAL

## 2020-12-11 PROCEDURE — 97110 THERAPEUTIC EXERCISES: CPT

## 2020-12-11 PROCEDURE — 97140 MANUAL THERAPY 1/> REGIONS: CPT

## 2020-12-11 NOTE — FLOWSHEET NOTE
balance, coordination, kinesthetic sense, posture, motor skill, proprioception for self-care, mobility, lifting, and ambulation. Therapeutic Activities:    [] (61484) Provided verbal/tactile cueing to address functional limitations related to loss of mobility, strength, balance, and coordination. Gait Training:  [] (09899) Provided training and instruction to the patient for proper postural muscle recruitment and positioning with ambulation re-education     Home Exercise Program:    [x] (65185) Reviewed/Progressed HEP activities related to strengthening, flexibility, endurance, ROM for functional self-care, mobility, lifting and ambulation   [] (19560) Reviewed/Progressed HEP activities related to improving balance, coordination, kinesthetic sense, posture, motor skill, proprioception for self-care, mobility, lifting, and ambulation      Manual Treatments:  MFR / STM / Oscillations-Mobs:  G-I, II, III, IV / Manipulation / MLD  [x] (52605) Provided manual therapy to mobilize  soft tissue/joints/fluid for the purpose of modulating pain, promoting relaxation, increasing ROM, reducing/eliminating soft tissue swelling/inflammation/restriction, improving soft tissue extensibility and allowing for proper ROM for normal function with self- care, mobility, lifting and ambulation.         Timed Code Treatment Minutes: 45   Total Treatment Minutes: 45     [] EVAL (LOW) 69672   [] EVAL (MOD) 79743   [] EVAL (HIGH) 83414   [] RE-EVAL   [x] TE (97171) x     [] Aquatic (81342) x  [] NMR (87599)   x  [] Aquatic Group (11533) x  [x] Manual (19963) x 2   [] Ultrasound (46853) x  [] TA (24962) x  [] Mech Traction (10225)  [] Ionto (89071)           [] ES (un) (28178):   [] Vasopump (85380) [] Other:      Assessment  [x] Patient tolerated treatment well [] Patient limited by fatigue  [] Patient limited by pain  [] Patient limited by other medical complications  [] Other:     Prognosis: [x] Good [] Fair  [] Poor    Goals: Short term goals  Time Frame for Short term goals: 4-6 weeks  Short term goal 1: Pain </= 0/10 at rest, 2/10 with activity  Short term goal 2: Patient able to demonstrate neck AROM WNL without symptoms  Short term goal 3: Patient able to sleep thru night without waking due to neck pain  Short term goal 4: Patient able to work entire day without increased symptoms  Short term goal 5: Patient independent with written home exercise program            Patient Requires Follow-up: [x] Yes  [] No    Plan:   [x] Continue per plan of care [] Alter current plan (see comments)  [] Plan of care initiated [] Hold pending MD visit [] Discharge  Note: If patient does not return for scheduled/ recommended follow up visits, this note will serve as a discharge from care along with most recent update on progress. Plan for Next Session:  Monitor response.       Electronically signed by:  Stephen Zuniga PTA

## 2020-12-14 ENCOUNTER — HOSPITAL ENCOUNTER (OUTPATIENT)
Dept: INFUSION THERAPY | Age: 40
Setting detail: INFUSION SERIES
Discharge: HOME | End: 2020-12-14
Payer: COMMERCIAL

## 2020-12-15 ENCOUNTER — APPOINTMENT (OUTPATIENT)
Dept: PHYSICAL THERAPY | Age: 40
End: 2020-12-15
Payer: COMMERCIAL

## 2020-12-18 ENCOUNTER — HOSPITAL ENCOUNTER (OUTPATIENT)
Dept: PHYSICAL THERAPY | Age: 40
Setting detail: THERAPIES SERIES
Discharge: HOME | End: 2020-12-18
Payer: COMMERCIAL

## 2020-12-18 PROCEDURE — 97140 MANUAL THERAPY 1/> REGIONS: CPT

## 2020-12-18 PROCEDURE — 97110 THERAPEUTIC EXERCISES: CPT

## 2020-12-18 NOTE — FLOWSHEET NOTE
Physical Therapy Daily Treatment Note  Date:  2020    Patient Name:  Kurt Singh    :  1980  MRN: [id number]    Restrictions/Precautions:  Restrictions/Precautions  Restrictions/Precautions: Fall Risk(Low)  Pertinent Medical History:      Medical/Treatment Diagnosis Information:  · Diagnosis: Chronic Neck Pain M54.2, G89.29  · Treatment Diagnosis: Neck Pain, Muscle Imbalance    Insurance/Certification information:  [facility], allowed 60 PT visits per contract year, $40 copay  Physician Information:  Referring Practitioner: ILAN Malin  Plan of care signed (Y/N): Faxed 20    Visit# / total visits:    Pain level: 5/10     Functional Outcomes Measure:  Test: Neck Disability Index  Score:8 = 16% Disability (20)     Progress Note: [x]  Yes  []  No  Next due by: Visit #10      History of Injury:Patient reports she has had neck pain for several years; 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 Patient has difficulty sleeping due to pain and anxiety, has stiffness in am, as the day goes on pain increases. Patient is working from home, 9 hours per day, 5 days per week, tries to take a break every 1-2 hours. Patient reports pain begins to increase after 3-4 hours working, climbs through the day, and is pretty uncomfortable by the end of the day. Patient does stretches, walking 30 minutes on treadmill. Patient does get some relief, but not complete. \"When things get really tight I get numbness and weakness in my L hand, especially the little finger. \"  Patient reports she applies heat, then it does relax some. Subjective:     20: Did pretty well following eval. Not too bad today.  Some tightness in posterior neck and L shoulder  20:  Patient reports neck is doing a little better, still is sore at the end of the day but the HEP is helping  12/11/20: Sore in neck and L shoulder. Has been anxious today. Overall, is feeling better since starting therapy  12/18/20:   Did great last session. A little tight today    Objective:   Observation: Mod tightness noted R upper trap, lev scap, SCM, scalenes; min tightness noted L upper trap, lev scap, SCM, scalenes.  Test measurements:      Exercises:  Exercise/Equipment Resistance/Repetitions Other comments        UE [place] flexion 10x R/L    Tslide:  Row               Ext               Lat pull Yellow 10x  Yellow 10x  Yellow 10x                                                              Other Therapeutic Activities:  12/1/20:  Discussed at length anatomy and biomechanics of the neck, muscle reeducation, motor recruitment. Home Exercise Program:  12/1/20:   Patient instructed in chin tucks, lower trap sets, forward bending, backward bending, rotation R and L, sidebending R and L; written instructions with pictures issued, patient able to demonstrate exercises. 12/7/20:   Patient instructed in wall slide with step, prone neck retraction; written instructions with pictures issued, patient able to demonstrate exercises. 12/11: levator and doorway stretch    Manual Treatments: Soft tissue mobilization to B cervical regions, with focus on upper traps, lev scap, SCM, scalenes; suboccipital release x 30 minutes total    Modalities:     Progression Towards Functional goals:  [x] Patient is progressing as expected towards functional goals listed. [] Progression is slowed due to complexities listed. [] Progression has been slowed due to co-morbidities. [] Plan just implemented, too soon to assess goals progression  [] Other:    Charges: Therapeutic Exercise:  [x] (80055) Provided verbal/tactile cueing for activities to restore or maintain strength, flexibility, endurance, ROM for improvements with self-care, mobility, lifting and ambulation.     Neuromuscular Re-Education  [] (03298) Provided verbal/tactile cueing for activities to restore or maintain balance, coordination, kinesthetic sense, posture, motor skill, proprioception for self-care, mobility, lifting, and ambulation. Therapeutic Activities:    [] (86665) Provided verbal/tactile cueing to address functional limitations related to loss of mobility, strength, balance, and coordination. Gait Training:  [] (90496) Provided training and instruction to the patient for proper postural muscle recruitment and positioning with ambulation re-education     Home Exercise Program:    [x] (91932) Reviewed/Progressed HEP activities related to strengthening, flexibility, endurance, ROM for functional self-care, mobility, lifting and ambulation   [] (08209) Reviewed/Progressed HEP activities related to improving balance, coordination, kinesthetic sense, posture, motor skill, proprioception for self-care, mobility, lifting, and ambulation      Manual Treatments:  MFR / STM / Oscillations-Mobs:  G-I, II, III, IV / Manipulation / MLD  [x] (16461) Provided manual therapy to mobilize  soft tissue/joints/fluid for the purpose of modulating pain, promoting relaxation, increasing ROM, reducing/eliminating soft tissue swelling/inflammation/restriction, improving soft tissue extensibility and allowing for proper ROM for normal function with self- care, mobility, lifting and ambulation.         Timed Code Treatment Minutes: 45   Total Treatment Minutes: 45     [] EVAL (LOW) 02346   [] EVAL (MOD) 29276   [] EVAL (HIGH) 54367   [] RE-EVAL   [x] TE (43600) x     [] Aquatic (28370) x  [] NMR (63799)   x  [] Aquatic Group (22153) x  [x] Manual (95659) x 2   [] Ultrasound (18392) x  [] TA (20722) x  [] Mech Traction (09846)  [] Ionto (03123)           [] ES (un) (64761):   [] Vasopump (53168) [] Other:      Assessment  [x] Patient tolerated treatment well [] Patient limited by fatigue  [] Patient limited by pain  [] Patient limited by other medical complications  []

## 2020-12-22 ENCOUNTER — HOSPITAL ENCOUNTER (OUTPATIENT)
Dept: PHYSICAL THERAPY | Age: 40
Setting detail: THERAPIES SERIES
Discharge: HOME | End: 2020-12-22
Payer: COMMERCIAL

## 2020-12-22 PROCEDURE — 97140 MANUAL THERAPY 1/> REGIONS: CPT

## 2020-12-22 PROCEDURE — 97110 THERAPEUTIC EXERCISES: CPT

## 2020-12-22 NOTE — FLOWSHEET NOTE
Physical Therapy Daily Treatment Note  Date:  2020    Patient Name:  London Morin    :  1980  MRN: [id number]    Restrictions/Precautions:  Restrictions/Precautions  Restrictions/Precautions: Fall Risk(Low)  Pertinent Medical History:      Medical/Treatment Diagnosis Information:  · Diagnosis: Chronic Neck Pain M54.2, G89.29  · Treatment Diagnosis: Neck Pain, Muscle Imbalance    Insurance/Certification information:  PT Insurance Information: [employer], allowed 60 PT visits per contract year, $40 copay  Physician Information:  Referring Practitioner: ILAN Alexis  Plan of care signed (Y/N): Faxed 20    Visit# / total visits:    Pain level: 5/10     Functional Outcomes Measure:  Test: Neck Disability Index  Score:8 = 16% Disability (20)     Progress Note: [x]  Yes  []  No  Next due by: Visit #10      History of Injury:Patient reports she has had neck pain for several years; 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 Patient has difficulty sleeping due to pain and anxiety, has stiffness in am, as the day goes on pain increases. Patient is working from home, 9 hours per day, 5 days per week, tries to take a break every 1-2 hours. Patient reports pain begins to increase after 3-4 hours working, climbs through the day, and is pretty uncomfortable by the end of the day. Patient does stretches, walking 30 minutes on treadmill. Patient does get some relief, but not complete. \"When things get really tight I get numbness and weakness in my L hand, especially the little finger. \"  Patient reports she applies heat, then it does relax some. Subjective:     20: Did pretty well following eval. Not too bad today.  Some tightness in posterior neck and L shoulder  20:  Patient reports neck is doing a little better, still is sore at the end of the day but the HEP is helping  12/11/20: Sore in neck and L shoulder. Has been anxious today. Overall, is feeling better since starting therapy  12/18/20:   Did great last session. A little tight today  12/22: Just feels tight or stiff    Objective:   Observation: Mod tightness noted R upper trap, lev scap, SCM, scalenes; min tightness noted L upper trap, lev scap, SCM, scalenes.  Test measurements:      Exercises:  Exercise/Equipment Resistance/Repetitions Other comments        UE [place] flexion 10x R/L    Tslide:  Row               Ext               Lat pull Yellow 10x  Yellow 10x  Yellow 10x                                                              Other Therapeutic Activities:  12/1/20:  Discussed at length anatomy and biomechanics of the neck, muscle reeducation, motor recruitment. Home Exercise Program:  12/1/20:   Patient instructed in chin tucks, lower trap sets, forward bending, backward bending, rotation R and L, sidebending R and L; written instructions with pictures issued, patient able to demonstrate exercises. 12/7/20:   Patient instructed in wall slide with step, prone neck retraction; written instructions with pictures issued, patient able to demonstrate exercises. 12/11: levator and doorway stretch    Manual Treatments: Soft tissue mobilization to B cervical regions, with focus on upper traps, lev scap, SCM, scalenes; suboccipital release x 30 minutes total    Modalities:     Progression Towards Functional goals:  [x] Patient is progressing as expected towards functional goals listed. [] Progression is slowed due to complexities listed. [] Progression has been slowed due to co-morbidities. [] Plan just implemented, too soon to assess goals progression  [] Other:    Charges: Therapeutic Exercise:  [x] (76554) Provided verbal/tactile cueing for activities to restore or maintain strength, flexibility, endurance, ROM for improvements with self-care, mobility, lifting and ambulation.     Neuromuscular other medical complications  [] Other:     Prognosis: [x] Good [] Fair  [] Poor    Goals:    Short term goals  Time Frame for Short term goals: 4-6 weeks  Short term goal 1: Pain </= 0/10 at rest, 2/10 with activity  Short term goal 2: Patient able to demonstrate neck AROM WNL without symptoms  Short term goal 3: Patient able to sleep thru night without waking due to neck pain  Short term goal 4: Patient able to work entire day without increased symptoms  Short term goal 5: Patient independent with written home exercise program            Patient Requires Follow-up: [x] Yes  [] No    Plan:   [x] Continue per plan of care [] Alter current plan (see comments)  [] Plan of care initiated [] Hold pending MD visit [] Discharge  Note: If patient does not return for scheduled/ recommended follow up visits, this note will serve as a discharge from care along with most recent update on progress. Plan for Next Session:  Monitor response.   Low trap against wall    Electronically signed by:  Maila Granados PTA

## 2020-12-29 ENCOUNTER — HOSPITAL ENCOUNTER (OUTPATIENT)
Dept: PHYSICAL THERAPY | Age: 40
Setting detail: THERAPIES SERIES
Discharge: HOME | End: 2020-12-29
Payer: COMMERCIAL

## 2020-12-29 PROCEDURE — 97110 THERAPEUTIC EXERCISES: CPT

## 2020-12-29 PROCEDURE — 97140 MANUAL THERAPY 1/> REGIONS: CPT

## 2020-12-29 NOTE — FLOWSHEET NOTE
helping  12/11/20: Sore in neck and L shoulder. Has been anxious today. Overall, is feeling better since starting therapy  12/18/20:   Did great last session. A little tight today  12/22: Just feels tight or stiff  12/29/20:  Patient reports she does ok in am upon waking, but notices pain on L side of neck along lev scap, lat to prox humeral region. This occurs both on work days and non-work days. Objective:   Observation: Mod tightness noted R upper trap, lev scap, SCM, scalenes; min tightness noted L upper trap, lev scap, SCM, scalenes.  Test measurements:      Exercises:  Exercise/Equipment Resistance/Repetitions Other comments        UE [place] flexion 10x R/L    Tslide:  Row               Ext               Lat pull Yellow 10x  Yellow 10x  Yellow 10x                                                              Other Therapeutic Activities:  12/1/20:  Discussed at length anatomy and biomechanics of the neck, muscle reeducation, motor recruitment. Home Exercise Program:  12/1/20:   Patient instructed in chin tucks, lower trap sets, forward bending, backward bending, rotation R and L, sidebending R and L; written instructions with pictures issued, patient able to demonstrate exercises. 12/7/20:   Patient instructed in wall slide with step, prone neck retraction; written instructions with pictures issued, patient able to demonstrate exercises. 12/11: levator and doorway stretch  12/29/20:   Patient instructed in theraband lat pull down, row, ext; standing lower trap raise; supine chin nod ; written instructions with pictures issued, patient able to demonstrate exercises. Manual Treatments: Soft tissue mobilization to B cervical regions, with focus on upper traps, lev scap, SCM, scalenes; suboccipital release x 30 minutes total    Modalities:     Progression Towards Functional goals:  [x] Patient is progressing as expected towards functional goals listed.     [] Progression is slowed due to complexities listed. [] Progression has been slowed due to co-morbidities. [] Plan just implemented, too soon to assess goals progression  [] Other:    Charges: Therapeutic Exercise:  [x] (34722) Provided verbal/tactile cueing for activities to restore or maintain strength, flexibility, endurance, ROM for improvements with self-care, mobility, lifting and ambulation. Neuromuscular Re-Education  [] (48251) Provided verbal/tactile cueing for activities to restore or maintain balance, coordination, kinesthetic sense, posture, motor skill, proprioception for self-care, mobility, lifting, and ambulation. Therapeutic Activities:    [] (22356) Provided verbal/tactile cueing to address functional limitations related to loss of mobility, strength, balance, and coordination. Gait Training:  [] (26539) Provided training and instruction to the patient for proper postural muscle recruitment and positioning with ambulation re-education     Home Exercise Program:    [x] (82499) Reviewed/Progressed HEP activities related to strengthening, flexibility, endurance, ROM for functional self-care, mobility, lifting and ambulation   [] (03643) Reviewed/Progressed HEP activities related to improving balance, coordination, kinesthetic sense, posture, motor skill, proprioception for self-care, mobility, lifting, and ambulation      Manual Treatments:  MFR / STM / Oscillations-Mobs:  G-I, II, III, IV / Manipulation / MLD  [x] (39059) Provided manual therapy to mobilize  soft tissue/joints/fluid for the purpose of modulating pain, promoting relaxation, increasing ROM, reducing/eliminating soft tissue swelling/inflammation/restriction, improving soft tissue extensibility and allowing for proper ROM for normal function with self- care, mobility, lifting and ambulation.         Timed Code Treatment Minutes: 45   Total Treatment Minutes: 45     [] EVAL (LOW) 38935   [] EVAL (MOD) 80971   [] EVAL (HIGH) 15532   [] RE-EVAL   [x] TE [phone]) x     [] Aquatic ([phone]) x  [] NMR (56183)   x  [] Aquatic Group (26721) x  [x] Manual (74979) x 2   [] Ultrasound (72470) x  [] TA (24202) x  [] Mech Traction (90201)  [] Ionto (63525)           [] ES (un) (54839):   [] Vasopump (95492) [] Other:      Assessment  [x] Patient tolerated treatment well [] Patient limited by fatigue  [] Patient limited by pain  [] Patient limited by other medical complications  [] Other:     Prognosis: [x] Good [] Fair  [] Poor    Goals:    Short term goals  Time Frame for Short term goals: 4-6 weeks  Short term goal 1: Pain </= 0/10 at rest, 2/10 with activity  Short term goal 2: Patient able to demonstrate neck AROM WNL without symptoms  Short term goal 3: Patient able to sleep thru night without waking due to neck pain  Short term goal 4: Patient able to work entire day without increased symptoms  Short term goal 5: Patient independent with written home exercise program            Patient Requires Follow-up: [x] Yes  [] No    Plan:   [x] Continue per plan of care [] Alter current plan (see comments)  [] Plan of care initiated [] Hold pending MD visit [] Discharge  Note: If patient does not return for scheduled/ recommended follow up visits, this note will serve as a discharge from care along with most recent update on progress. Plan for Next Session:  Monitor response. Instruct patient in wall push up if able.     Electronically signed by:  Madeline Delgado, [address] [facility]

## 2021-01-04 ENCOUNTER — HOSPITAL ENCOUNTER (OUTPATIENT)
Dept: PHYSICAL THERAPY | Age: 41
Setting detail: THERAPIES SERIES
Discharge: HOME | End: 2021-01-04
Payer: COMMERCIAL

## 2021-01-04 PROCEDURE — 97140 MANUAL THERAPY 1/> REGIONS: CPT

## 2021-01-04 PROCEDURE — 97110 THERAPEUTIC EXERCISES: CPT

## 2021-01-08 ENCOUNTER — HOSPITAL ENCOUNTER (OUTPATIENT)
Dept: PHYSICAL THERAPY | Age: 41
Setting detail: THERAPIES SERIES
Discharge: HOME | End: 2021-01-08
Payer: COMMERCIAL

## 2021-01-08 PROCEDURE — 97110 THERAPEUTIC EXERCISES: CPT

## 2021-01-08 PROCEDURE — 97140 MANUAL THERAPY 1/> REGIONS: CPT

## 2021-01-08 NOTE — FLOWSHEET NOTE
helping  12/11/20: Sore in neck and L shoulder. Has been anxious today. Overall, is feeling better since starting therapy  12/18/20:   Did great last session. A little tight today  12/22: Just feels tight or stiff  12/29/20:  Patient reports she does ok in am upon waking, but notices pain on L side of neck along lev scap, lat to prox humeral region. This occurs both on work days and non-work days. 1/4/21: Patient reports doing better overall, has had some tingling and achiness near Deltoid tuberosity with a lump noted. 1/8/21: Still getting some tingling in L shoulder blade and along lateral upper arm    Objective:   Observation: Mod tightness noted R upper trap, lev scap, SCM, scalenes; min tightness noted L upper trap, lev scap, SCM, scalenes.  Test measurements:      Exercises:  Exercise/Equipment Resistance/Repetitions Other comments        UE [place] flexion 10x R/L    Tslide:  Row               Ext               Lat pull Yellow 10x  Yellow 10x  Yellow 10x                                                              Other Therapeutic Activities:  12/1/20:  Discussed at length anatomy and biomechanics of the neck, muscle reeducation, motor recruitment. Home Exercise Program:  12/1/20:   Patient instructed in chin tucks, lower trap sets, forward bending, backward bending, rotation R and L, sidebending R and L; written instructions with pictures issued, patient able to demonstrate exercises. 12/7/20:   Patient instructed in wall slide with step, prone neck retraction; written instructions with pictures issued, patient able to demonstrate exercises. 12/11: levator and doorway stretch  12/29/20:   Patient instructed in theraband lat pull down, row, ext; standing lower trap raise; supine chin nod ; written instructions with pictures issued, patient able to demonstrate exercises.   1/4/21:   Patient instructed in wall push up ; written instructions with pictures issued, patient able to demonstrate exercises. 1/8/21: standing lower trap raise    Manual Treatments: Soft tissue mobilization to B cervical regions, with focus on upper traps, lev scap, SCM, scalenes, L scapula; suboccipital release x 30 minutes total    Modalities:     Progression Towards Functional goals:  [x] Patient is progressing as expected towards functional goals listed. [] Progression is slowed due to complexities listed. [] Progression has been slowed due to co-morbidities. [] Plan just implemented, too soon to assess goals progression  [] Other:    Charges: Therapeutic Exercise:  [x] (18778) Provided verbal/tactile cueing for activities to restore or maintain strength, flexibility, endurance, ROM for improvements with self-care, mobility, lifting and ambulation. Neuromuscular Re-Education  [] (20359) Provided verbal/tactile cueing for activities to restore or maintain balance, coordination, kinesthetic sense, posture, motor skill, proprioception for self-care, mobility, lifting, and ambulation. Therapeutic Activities:    [] (36158) Provided verbal/tactile cueing to address functional limitations related to loss of mobility, strength, balance, and coordination.      Gait Training:  [] (65997) Provided training and instruction to the patient for proper postural muscle recruitment and positioning with ambulation re-education     Home Exercise Program:    [x] (61757) Reviewed/Progressed HEP activities related to strengthening, flexibility, endurance, ROM for functional self-care, mobility, lifting and ambulation   [] (89683) Reviewed/Progressed HEP activities related to improving balance, coordination, kinesthetic sense, posture, motor skill, proprioception for self-care, mobility, lifting, and ambulation      Manual Treatments:  MFR / STM / Oscillations-Mobs:  G-I, II, III, IV / Manipulation / MLD  [x] (00815) Provided manual therapy to mobilize  soft tissue/joints/fluid for the purpose of modulating pain, promoting relaxation, increasing ROM, reducing/eliminating soft tissue swelling/inflammation/restriction, improving soft tissue extensibility and allowing for proper ROM for normal function with self- care, mobility, lifting and ambulation. Timed Code Treatment Minutes: 45   Total Treatment Minutes: 45     [] EVAL (LOW) 96388   [] EVAL (MOD) 30769   [] EVAL (HIGH) 95129   [] RE-EVAL   [x] TE (52404) x     [] Aquatic (81580) x  [] NMR (48239)   x  [] Aquatic Group (34368) x  [x] Manual (78837) x 2   [] Ultrasound (14671) x  [] TA (77007) x  [] Mech Traction (44068)  [] Ionto (91102)           [] ES (un) (58643):   [] Vasopump (72956) [] Other:      Assessment  [x] Patient tolerated treatment well [] Patient limited by fatigue  [] Patient limited by pain  [] Patient limited by other medical complications  [] Other:     Prognosis: [x] Good [] Fair  [] Poor    Goals:    Short term goals  Time Frame for Short term goals: 4-6 weeks  Short term goal 1: Pain </= 0/10 at rest, 2/10 with activity  Short term goal 2: Patient able to demonstrate neck AROM WNL without symptoms  Short term goal 3: Patient able to sleep thru night without waking due to neck pain  Short term goal 4: Patient able to work entire day without increased symptoms  Short term goal 5: Patient independent with written home exercise program            Patient Requires Follow-up: [x] Yes  [] No    Plan:   [x] Continue per plan of care [] Alter current plan (see comments)  [] Plan of care initiated [] Hold pending MD visit [] Discharge  Note: If patient does not return for scheduled/ recommended follow up visits, this note will serve as a discharge from care along with most recent update on progress. Plan for Next Session:  Monitor response.       Electronically signed by:  Angelina Eaton PTA

## 2021-01-11 ENCOUNTER — HOSPITAL ENCOUNTER (OUTPATIENT)
Dept: PHYSICAL THERAPY | Age: 41
Setting detail: THERAPIES SERIES
Discharge: HOME | End: 2021-01-11
Payer: COMMERCIAL

## 2021-01-11 PROCEDURE — 97140 MANUAL THERAPY 1/> REGIONS: CPT

## 2021-01-11 PROCEDURE — 97110 THERAPEUTIC EXERCISES: CPT

## 2021-01-11 NOTE — FLOWSHEET NOTE
Physical Therapy Daily Treatment Note  Date:  2021    Patient Name:  Aliyah Strong    :  1980  MRN: [id number]    Restrictions/Precautions:  Restrictions/Precautions  Restrictions/Precautions: Fall Risk(Low)  Pertinent Medical History:      Medical/Treatment Diagnosis Information:  · Diagnosis: Chronic Neck Pain M54.2, G89.29  · Treatment Diagnosis: Neck Pain, Muscle Imbalance    Insurance/Certification information:  [facility], allowed [facility] PT visits per contract year, $40 copay  Physician Information:  Referring Practitioner: ILAN Hitchcock  Plan of care signed (Y/N): Faxed 20    Visit# / total visits:  10/12  Pain level: 4-5/10     Functional Outcomes Measure:  Test: Neck Disability Index  Score:8 = 16% Disability (20)     Progress Note: [x]  Yes  []  No  Next due by: Visit #10      History of Injury:Patient reports she has had neck pain for several years; 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 Patient has difficulty sleeping due to pain and anxiety, has stiffness in am, as the day goes on pain increases. Patient is working from home, 9 hours per day, 5 days per week, tries to take a break every 1-2 hours. Patient reports pain begins to increase after 3-4 hours working, climbs through the day, and is pretty uncomfortable by the end of the day. Patient does stretches, walking 30 minutes on treadmill. Patient does get some relief, but not complete. \"When things get really tight I get numbness and weakness in my L hand, especially the little finger. \"  Patient reports she applies heat, then it does relax some. Subjective:     20: Did pretty well following eval. Not too bad today.  Some tightness in posterior neck and L shoulder  20:  Patient reports neck is doing a little better, still is sore at the end of the day but the HEP is helping  12/11/20: Sore in neck and L shoulder. Has been anxious today. Overall, is feeling better since starting therapy  12/18/20:   Did great last session. A little tight today  12/22: Just feels tight or stiff  12/29/20:  Patient reports she does ok in am upon waking, but notices pain on L side of neck along lev scap, lat to prox humeral region. This occurs both on work days and non-work days. 1/4/21: Patient reports doing better overall, has had some tingling and achiness near Deltoid tuberosity with a lump noted. 1/8/21: Still getting some tingling in L shoulder blade and along lateral upper arm  1/11/21: Felt good after last session and over the weekend. Tightened up today with being on the computer and working    Objective:   Observation: Mod tightness noted R upper trap, lev scap, SCM, scalenes; min tightness noted L upper trap, lev scap, SCM, scalenes.  Test measurements:      Exercises:  Exercise/Equipment Resistance/Repetitions Other comments        UE [place] flexion 10x R/L    Tslide:  Row               Ext               Lat pull Yellow 10x  Yellow 10x  Yellow 10x                                                              Other Therapeutic Activities:  12/1/20:  Discussed at length anatomy and biomechanics of the neck, muscle reeducation, motor recruitment. Home Exercise Program:  12/1/20:   Patient instructed in chin tucks, lower trap sets, forward bending, backward bending, rotation R and L, sidebending R and L; written instructions with pictures issued, patient able to demonstrate exercises. 12/7/20:   Patient instructed in wall slide with step, prone neck retraction; written instructions with pictures issued, patient able to demonstrate exercises. 12/11: levator and doorway stretch  12/29/20:   Patient instructed in theraband lat pull down, row, ext; standing lower trap raise; supine chin nod ; written instructions with pictures issued, patient able to demonstrate exercises.   1/4/21: Patient instructed in wall push up ; written instructions with pictures issued, patient able to demonstrate exercises. 1/8/21: standing lower trap raise    Manual Treatments: Soft tissue mobilization to B cervical regions, with focus on upper traps, lev scap, SCM, scalenes, L scapula; suboccipital release x 30 minutes total    Modalities:     Progression Towards Functional goals:  [x] Patient is progressing as expected towards functional goals listed. [] Progression is slowed due to complexities listed. [] Progression has been slowed due to co-morbidities. [] Plan just implemented, too soon to assess goals progression  [] Other:    Charges: Therapeutic Exercise:  [x] (58134) Provided verbal/tactile cueing for activities to restore or maintain strength, flexibility, endurance, ROM for improvements with self-care, mobility, lifting and ambulation. Neuromuscular Re-Education  [] (11249) Provided verbal/tactile cueing for activities to restore or maintain balance, coordination, kinesthetic sense, posture, motor skill, proprioception for self-care, mobility, lifting, and ambulation. Therapeutic Activities:    [] (39149) Provided verbal/tactile cueing to address functional limitations related to loss of mobility, strength, balance, and coordination.      Gait Training:  [] (54662) Provided training and instruction to the patient for proper postural muscle recruitment and positioning with ambulation re-education     Home Exercise Program:    [x] (64956) Reviewed/Progressed HEP activities related to strengthening, flexibility, endurance, ROM for functional self-care, mobility, lifting and ambulation   [] (90481) Reviewed/Progressed HEP activities related to improving balance, coordination, kinesthetic sense, posture, motor skill, proprioception for self-care, mobility, lifting, and ambulation      Manual Treatments:  MFR / STM / Oscillations-Mobs:  G-I, II, III, IV / Manipulation / MLD  [x] (28842) Provided manual therapy to mobilize  soft tissue/joints/fluid for the purpose of modulating pain, promoting relaxation, increasing ROM, reducing/eliminating soft tissue swelling/inflammation/restriction, improving soft tissue extensibility and allowing for proper ROM for normal function with self- care, mobility, lifting and ambulation. Timed Code Treatment Minutes: 45   Total Treatment Minutes: 50     [] EVAL (LOW) 54964   [] EVAL (MOD) 08076   [] EVAL (HIGH) 09536   [] RE-EVAL   [x] TE (08874) x     [] Aquatic (56115) x  [] NMR (53961)   x  [] Aquatic Group (08556) x  [x] Manual (52993) x 2   [] Ultrasound (10102) x  [] TA (12988) x  [] Mech Traction (54072)  [] Ionto (70109)           [] ES (un) (41262):   [] Vasopump (94777) [] Other:      Assessment  [x] Patient tolerated treatment well [] Patient limited by fatigue  [] Patient limited by pain  [] Patient limited by other medical complications  [] Other:     Prognosis: [x] Good [] Fair  [] Poor    Goals:    Short term goals  Time Frame for Short term goals: 4-6 weeks  Short term goal 1: Pain </= 0/10 at rest, 2/10 with activity  Short term goal 2: Patient able to demonstrate neck AROM WNL without symptoms  Short term goal 3: Patient able to sleep thru night without waking due to neck pain  Short term goal 4: Patient able to work entire day without increased symptoms  Short term goal 5: Patient independent with written home exercise program            Patient Requires Follow-up: [x] Yes  [] No    Plan:   [x] Continue per plan of care [] Alter current plan (see comments)  [] Plan of care initiated [] Hold pending MD visit [] Discharge  Note: If patient does not return for scheduled/ recommended follow up visits, this note will serve as a discharge from care along with most recent update on progress. Plan for Next Session:  Monitor response.       Electronically signed by:  Max Mathew PTA

## 2021-01-13 ENCOUNTER — HOSPITAL ENCOUNTER (OUTPATIENT)
Dept: PHYSICAL THERAPY | Age: 41
Setting detail: THERAPIES SERIES
Discharge: HOME | End: 2021-01-13
Payer: COMMERCIAL

## 2021-01-13 PROCEDURE — 97110 THERAPEUTIC EXERCISES: CPT

## 2021-01-13 PROCEDURE — 97140 MANUAL THERAPY 1/> REGIONS: CPT

## 2021-01-13 NOTE — FLOWSHEET NOTE
Physical Therapy Daily Treatment Note  Date:  2021    Patient Name:  Arpit Dietrich    :  1980  MRN: [id number]    Restrictions/Precautions:  Restrictions/Precautions  Restrictions/Precautions: Fall Risk(Low)  Pertinent Medical History:      Medical/Treatment Diagnosis Information:  · Diagnosis: Chronic Neck Pain M54.2, G89.29  · Treatment Diagnosis: Neck Pain, Muscle Imbalance    Insurance/Certification information:  PT Insurance Information: [employer], allowed 60 PT visits per contract year, $40 copay  Physician Information:  Referring Practitioner: Waldron Callas, APRN-CNP  Plan of care signed (Y/N): Faxed 20    Visit# / total visits:  10/12  Pain level: 4-5/10     Functional Outcomes Measure:  Test: Neck Disability Index  Score:8 = 16% Disability (20)     Progress Note: [x]  Yes  []  No  Next due by: Visit #10      History of Injury:Patient reports she has had neck pain for several years; 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 Patient has difficulty sleeping due to pain and anxiety, has stiffness in am, as the day goes on pain increases. Patient is working from home, 9 hours per day, 5 days per week, tries to take a break every 1-2 hours. Patient reports pain begins to increase after 3-4 hours working, climbs through the day, and is pretty uncomfortable by the end of the day. Patient does stretches, walking 30 minutes on treadmill. Patient does get some relief, but not complete. \"When things get really tight I get numbness and weakness in my L hand, especially the little finger. \"  Patient reports she applies heat, then it does relax some. Subjective:     20: Did pretty well following eval. Not too bad today.  Some tightness in posterior neck and L shoulder  20:  Patient reports neck is doing a little better, still is sore at the end of the day but the HEP is row, ext; standing lower trap raise; supine chin nod ; written instructions with pictures issued, patient able to demonstrate exercises. 1/4/21:   Patient instructed in wall push up ; written instructions with pictures issued, patient able to demonstrate exercises. 1/8/21: standing lower trap raise    Manual Treatments: Soft tissue mobilization to B cervical regions, with focus on upper traps, lev scap, SCM, scalenes, L scapula; suboccipital release; cervical unwinding, L UE arm pull x 30 minutes total    Modalities:     Progression Towards Functional goals:  [x] Patient is progressing as expected towards functional goals listed. [] Progression is slowed due to complexities listed. [] Progression has been slowed due to co-morbidities. [] Plan just implemented, too soon to assess goals progression  [] Other:    Charges: Therapeutic Exercise:  [x] (41311) Provided verbal/tactile cueing for activities to restore or maintain strength, flexibility, endurance, ROM for improvements with self-care, mobility, lifting and ambulation. Neuromuscular Re-Education  [] (88023) Provided verbal/tactile cueing for activities to restore or maintain balance, coordination, kinesthetic sense, posture, motor skill, proprioception for self-care, mobility, lifting, and ambulation. Therapeutic Activities:    [] (15507) Provided verbal/tactile cueing to address functional limitations related to loss of mobility, strength, balance, and coordination.      Gait Training:  [] (50538) Provided training and instruction to the patient for proper postural muscle recruitment and positioning with ambulation re-education     Home Exercise Program:    [x] (58877) Reviewed/Progressed HEP activities related to strengthening, flexibility, endurance, ROM for functional self-care, mobility, lifting and ambulation   [] (85646) Reviewed/Progressed HEP activities related to improving balance, coordination, kinesthetic sense, posture, motor skill, proprioception for self-care, mobility, lifting, and ambulation      Manual Treatments:  MFR / STM / Oscillations-Mobs:  G-I, II, III, IV / Manipulation / MLD  [x] (50658) Provided manual therapy to mobilize  soft tissue/joints/fluid for the purpose of modulating pain, promoting relaxation, increasing ROM, reducing/eliminating soft tissue swelling/inflammation/restriction, improving soft tissue extensibility and allowing for proper ROM for normal function with self- care, mobility, lifting and ambulation.         Timed Code Treatment Minutes: 45   Total Treatment Minutes: 50     [] EVAL (LOW) 73574   [] EVAL (MOD) 76321   [] EVAL (HIGH) 31438   [] RE-EVAL   [x] TE (27911) x     [] Aquatic (50621) x  [] NMR (26977)   x  [] Aquatic Group (06826) x  [x] Manual (68922) x 2   [] Ultrasound (33294) x  [] TA (05444) x  [] Mech Traction (82067)  [] Ionto (01679)           [] ES (un) (56254):   [] Vasopump (10355) [] Other:      Assessment  [x] Patient tolerated treatment well [] Patient limited by fatigue  [] Patient limited by pain  [] Patient limited by other medical complications  [] Other:     Prognosis: [x] Good [] Fair  [] Poor    Goals:    Short term goals  Time Frame for Short term goals: 4-6 weeks  Short term goal 1: Pain </= 0/10 at rest, 2/10 with activity  Short term goal 2: Patient able to demonstrate neck AROM WNL without symptoms  Short term goal 3: Patient able to sleep thru night without waking due to neck pain  Short term goal 4: Patient able to work entire day without increased symptoms  Short term goal 5: Patient independent with written home exercise program            Patient Requires Follow-up: [x] Yes  [] No    Plan:   [x] Continue per plan of care [] Alter current plan (see comments)  [] Plan of care initiated [] Hold pending MD visit [] Discharge  Note: If patient does not return for scheduled/ recommended follow up visits, this note will serve as a discharge from care along with most recent update on progress. Plan for Next Session:  Monitor response.       Electronically signed by:  Prabha Barone, [id number] [facility]

## 2021-01-18 ENCOUNTER — HOSPITAL ENCOUNTER (OUTPATIENT)
Dept: PHYSICAL THERAPY | Age: 41
Setting detail: THERAPIES SERIES
Discharge: HOME | End: 2021-01-18
Payer: COMMERCIAL

## 2021-01-18 PROCEDURE — 97140 MANUAL THERAPY 1/> REGIONS: CPT

## 2021-01-18 PROCEDURE — 97110 THERAPEUTIC EXERCISES: CPT

## 2021-01-18 NOTE — FLOWSHEET NOTE
Patient reports she does well in am, but is sore at end of day. Patient reports she feels she is able to reduce soreness and tightness with HEP    Objective:   Observation:  Min tightness noted R upper trap, lev scap, SCM, scalenes; min tightness noted L upper trap, lev scap, SCM, scalenes.  Test measurements:      Exercises:  Exercise/Equipment Resistance/Repetitions Other comments        UE [place] flexion 10x R/L    Tslide:  Row               Ext               Lat pull Yellow 10x  Yellow 10x  Yellow 10x                                                              Other Therapeutic Activities:  12/1/20:  Discussed at length anatomy and biomechanics of the neck, muscle reeducation, motor recruitment. Home Exercise Program:  12/1/20:   Patient instructed in chin tucks, lower trap sets, forward bending, backward bending, rotation R and L, sidebending R and L; written instructions with pictures issued, patient able to demonstrate exercises. 12/7/20:   Patient instructed in wall slide with step, prone neck retraction; written instructions with pictures issued, patient able to demonstrate exercises. 12/11: levator and doorway stretch  12/29/20:   Patient instructed in theraband lat pull down, row, ext; standing lower trap raise; supine chin nod ; written instructions with pictures issued, patient able to demonstrate exercises. 1/4/21:   Patient instructed in wall push up ; written instructions with pictures issued, patient able to demonstrate exercises. 1/8/21: standing lower trap raise    Manual Treatments: Soft tissue mobilization to B cervical regions, with focus on upper traps, lev scap, SCM, scalenes, L scapula; suboccipital release; cervical unwinding, L UE arm pull x 30 minutes total    Modalities:     Progression Towards Functional goals:  [x] Patient is progressing as expected towards functional goals listed. [] Progression is slowed due to complexities listed.   [] Progression has been slowed due to co-morbidities. [] Plan just implemented, too soon to assess goals progression  [] Other:    Charges: Therapeutic Exercise:  [x] (69819) Provided verbal/tactile cueing for activities to restore or maintain strength, flexibility, endurance, ROM for improvements with self-care, mobility, lifting and ambulation. Neuromuscular Re-Education  [] (88835) Provided verbal/tactile cueing for activities to restore or maintain balance, coordination, kinesthetic sense, posture, motor skill, proprioception for self-care, mobility, lifting, and ambulation. Therapeutic Activities:    [] (31103) Provided verbal/tactile cueing to address functional limitations related to loss of mobility, strength, balance, and coordination. Gait Training:  [] (19651) Provided training and instruction to the patient for proper postural muscle recruitment and positioning with ambulation re-education     Home Exercise Program:    [x] (12543) Reviewed/Progressed HEP activities related to strengthening, flexibility, endurance, ROM for functional self-care, mobility, lifting and ambulation   [] (00589) Reviewed/Progressed HEP activities related to improving balance, coordination, kinesthetic sense, posture, motor skill, proprioception for self-care, mobility, lifting, and ambulation      Manual Treatments:  MFR / STM / Oscillations-Mobs:  G-I, II, III, IV / Manipulation / MLD  [x] (18503) Provided manual therapy to mobilize  soft tissue/joints/fluid for the purpose of modulating pain, promoting relaxation, increasing ROM, reducing/eliminating soft tissue swelling/inflammation/restriction, improving soft tissue extensibility and allowing for proper ROM for normal function with self- care, mobility, lifting and ambulation.         Timed Code Treatment Minutes: 45   Total Treatment Minutes: 50     [] EVAL (LOW) 23669   [] EVAL (MOD) 62258   [] EVAL (HIGH) 59947   [] RE-EVAL   [x] TE (98068) x     [] Aquatic (90183) x  [] NMR (99686)   x  [] Aquatic Group (46277) x  [x] Manual (84205) x 2   [] Ultrasound (52678) x  [] TA (03670) x  [] Mech Traction (66091)  [] Ionto (76975)           [] ES (un) (70273):   [] Vasopump (88444) [] Other:      Assessment  [x] Patient tolerated treatment well [] Patient limited by fatigue  [] Patient limited by pain  [] Patient limited by other medical complications  [] Other:     Prognosis: [x] Good [] Fair  [] Poor    Goals:    Short term goals  Time Frame for Short term goals: 4-6 weeks  Short term goal 1: Pain </= 0/10 at rest, 2/10 with activity  Improved but not met  Short term goal 2: Patient able to demonstrate neck AROM WNL without symptoms Met  Short term goal 3: Patient able to sleep thru night without waking due to neck pain Met  Short term goal 4: Patient able to work entire day without increased symptoms  Improved but not met  Short term goal 5: Patient independent with written home exercise program Met           Patient Requires Follow-up: [] Yes  [x] No    Plan:   [] Continue per plan of care [] Alter current plan (see comments)  [] Plan of care initiated [] Hold pending MD visit [x] Discharge  Note: If patient does not return for scheduled/ recommended follow up visits, this note will serve as a discharge from care along with most recent update on progress.     Plan for Next Session:  Patient discharged to independent home exercise program..      Electronically signed by:  Vern Romero, [address] [place] [address]

## 2021-01-18 NOTE — PROGRESS NOTES
Outpatient Physical Therapy  [] [facility]    Phone: [phone]   Fax: [phone]   [] [facility]  Phone: [phone]   Fax: [phone]  [x] [facility]              Phone: [phone]   Fax: [phone]     Physical Therapy Discharge Note  Date: 2021        Patient Name:  Renny Sevilla    :  1980  MRN: [id number]  Restrictions/Precautions:  Restrictions/Precautions  Restrictions/Precautions: Fall Risk(Low)  Pertinent Medical History:       Medical/Treatment Diagnosis Information:  · Diagnosis: Chronic Neck Pain M54.2, G89.29  · Treatment Diagnosis: Neck Pain, Muscle Imbalance     Insurance/Certification information:  PT Insurance Information: Luis, allowed 60 PT visits per contract year, $40 copay  Physician Information:  Referring Practitioner: ILAN Thorne  Plan of care signed (Y/N): Faxed 20     Visit# / total visits:    Pain level:      5/10 at worst, when stressed at computer;  0/10 in am; gets worse as the day goes on on work days          Functional Outcomes Measure:  Test: Neck Disability Index  Score:8 = 16% Disability (20)              5 = 10% Disability (21)    Time Period for Report:  20 thru 21  Cancels/No-shows to date:  None    Plan of Care/Treatment to date:  [x] Therapeutic Exercise    [] Modalities:  [x] Therapeutic Activity     [] Ultrasound  [] Electrical Stimulation  [] Gait Training      [] Cervical Traction    [] Lumbar Traction  [] Neuromuscular Re-education  [] Cold/hotpack [] Iontophoresis  [x] Instruction in HEP      Other:  [x] Manual Therapy       []    [] Aquatic Therapy       []                          Significant Findings At Last Visit/Comments:       Subjective:   Patient reports doing pretty good overall, continues to get sore and tight with work. Patient reports she does well in am, but is sore at end of day.   Patient reports she feels she is able to reduce soreness and tightness with HEP     Objective:  · Observation:  Min tightness noted R upper trap, lev scap, SCM, scalenes; min tightness noted L upper trap, lev scap, SCM, scalenes. · Test measurements:          Assessment:   Summary: Patient has been seen for 11 PT visits with significant progress noted. Patient is independent with written home exercise program.    Progression Towards Functional goals:  [] Patient is progressing as expected towards functional goals listed. [] Progression is slowed due to complexities listed. [] Progression has been slowed due to co-morbidities. [] Plan just implemented, too soon to assess goals progression  [] Other:    Goals:  Short term goals  Time Frame for Short term goals: 4-6 weeks  Short term goal 1: Pain </= 0/10 at rest, 2/10 with activity  Improved but not met  Short term goal 2: Patient able to demonstrate neck AROM WNL without symptoms Met  Short term goal 3: Patient able to sleep thru night without waking due to neck pain Met  Short term goal 4: Patient able to work entire day without increased symptoms  Improved but not met  Short term goal 5: Patient independent with written home exercise program Met         Rehab Potential: [] Excellent [x] Good [x] Fair  [] Poor     Goal Status:  [x] Achieved [x] Partially Achieved  [] Not Achieved     Current Frequency/Duration:  # Days per week: [] 1 day # Weeks: [] 1 week [] 4 weeks      [x] 2 days   [] 2 weeks [] 5 weeks      [] 3 days   [] 3 weeks [x] 6 weeks     Patient Status: [] Continue per initial plan of Care     [x] Patient now discharged     [] Additional visits requested, Please re-certify for additional visits:      Requested frequency/duration:  X/week for weeks    Electronically signed by:  Se Hampton, PT [id number]    If you have any questions or concerns, please don't hesitate to call.   Thank you for your referral.    Physician Signature:________________________________Date:__________________  By signing above, therapists plan is approved by physician

## 2021-01-20 ENCOUNTER — HOSPITAL ENCOUNTER (OUTPATIENT)
Dept: INFUSION THERAPY | Age: 41
Setting detail: INFUSION SERIES
End: 2021-01-20
Payer: COMMERCIAL

## 2021-06-12 ENCOUNTER — HOSPITAL ENCOUNTER (OUTPATIENT)
Age: 41
Discharge: HOME | End: 2021-06-12
Payer: COMMERCIAL

## 2021-06-12 ENCOUNTER — HOSPITAL ENCOUNTER (OUTPATIENT)
Dept: GENERAL RADIOLOGY | Age: 41
Discharge: HOME | End: 2021-06-12
Payer: COMMERCIAL

## 2021-06-12 DIAGNOSIS — S90.932D: ICD-10-CM

## 2021-06-12 PROCEDURE — 73630 X-RAY EXAM OF FOOT: CPT

## 2021-12-06 LAB
A/G RATIO: 1 (ref 1.1–2.2)
ALBUMIN SERPL-MCNC: 4.1 G/DL (ref 3.4–5)
ALP BLD-CCNC: 63 U/L (ref 40–129)
ALT SERPL-CCNC: 7 U/L (ref 10–40)
ANION GAP SERPL CALCULATED.3IONS-SCNC: 11 MMOL/L (ref 3–16)
AST SERPL-CCNC: 23 U/L (ref 15–37)
BILIRUB SERPL-MCNC: 0.3 MG/DL (ref 0–1)
BUN BLDV-MCNC: 18 MG/DL (ref 7–20)
CALCIUM SERPL-MCNC: 9.3 MG/DL (ref 8.3–10.6)
CHLORIDE BLD-SCNC: 99 MMOL/L (ref 99–110)
CO2: 26 MMOL/L (ref 21–32)
CREAT SERPL-MCNC: 0.7 MG/DL (ref 0.6–1.1)
FOLATE: >20 NG/ML (ref 4.78–24.2)
GFR AFRICAN AMERICAN: >60
GFR NON-AFRICAN AMERICAN: >60
GLUCOSE BLD-MCNC: 94 MG/DL (ref 70–99)
HCT VFR BLD CALC: 38.3 % (ref 36–48)
HEMOGLOBIN: 12.7 G/DL (ref 12–16)
MCH RBC QN AUTO: 31.4 PG (ref 26–34)
MCHC RBC AUTO-ENTMCNC: 33.2 G/DL (ref 31–36)
MCV RBC AUTO: 94.5 FL (ref 80–100)
PDW BLD-RTO: 12.2 % (ref 12.4–15.4)
PLATELET # BLD: 278 K/UL (ref 135–450)
PMV BLD AUTO: 7.5 FL (ref 5–10.5)
POTASSIUM SERPL-SCNC: 4.2 MMOL/L (ref 3.5–5.1)
RBC # BLD: 4.05 M/UL (ref 4–5.2)
SODIUM BLD-SCNC: 136 MMOL/L (ref 136–145)
TOTAL PROTEIN: 8.1 G/DL (ref 6.4–8.2)
VITAMIN B-12: 996 PG/ML (ref 211–911)
WBC # BLD: 8.9 K/UL (ref 4–11)

## 2021-12-10 LAB
QUANTI TB GOLD PLUS: NEGATIVE
QUANTI TB1 MINUS NIL: 0 IU/ML (ref 0–0.34)
QUANTI TB2 MINUS NIL: 0 IU/ML (ref 0–0.34)
QUANTIFERON MITOGEN: 7.67 IU/ML
QUANTIFERON NIL: 0.01 IU/ML

## 2022-05-17 ENCOUNTER — OFFICE VISIT (OUTPATIENT)
Dept: SURGERY | Age: 42
End: 2022-05-17
Payer: COMMERCIAL

## 2022-05-17 VITALS
OXYGEN SATURATION: 96 % | BODY MASS INDEX: 19.29 KG/M2 | RESPIRATION RATE: 16 BRPM | HEIGHT: 66 IN | TEMPERATURE: 97.7 F | DIASTOLIC BLOOD PRESSURE: 79 MMHG | SYSTOLIC BLOOD PRESSURE: 122 MMHG | WEIGHT: 120 LBS | HEART RATE: 95 BPM

## 2022-05-17 DIAGNOSIS — R21 PERIANAL RASH: ICD-10-CM

## 2022-05-17 DIAGNOSIS — K50.80 CROHN'S DISEASE OF BOTH SMALL AND LARGE INTESTINE WITHOUT COMPLICATION (HCC): Primary | ICD-10-CM

## 2022-05-17 PROCEDURE — 99204 OFFICE O/P NEW MOD 45 MIN: CPT | Performed by: SURGERY

## 2022-05-17 RX ORDER — POLYETHYLENE GLYCOL 3350 17 G/17G
17 POWDER, FOR SOLUTION ORAL DAILY PRN
COMMUNITY

## 2022-05-17 RX ORDER — VENLAFAXINE HYDROCHLORIDE 37.5 MG/1
37.5 CAPSULE, EXTENDED RELEASE ORAL DAILY
COMMUNITY

## 2022-05-17 RX ORDER — TAMOXIFEN CITRATE 20 MG/1
20 TABLET ORAL DAILY
COMMUNITY

## 2022-05-17 NOTE — PROGRESS NOTES
[facility]  [address] [place] [address],[facility]  Dept: [phone]  Dept Fax: [phone]  Loc: [phone]    Visit Date: 5/17/2022    Joan Goldstein is a 43 y.o. female who presents today for: New Patient (Referral from Dr. Stanley-Irritation from Crohn's (Per patietn the irriatation is painful, red and swollen))      HPI:       Joan Goldstein is a 43 y.o. female referred to me for further evaluation regarding perianal rash. Alexis Watkins has a 22-year history of Crohn's disease of the entire colon and terminal ileum. It has been well controlled with Remicade. She has not required any previous surgeries. Over the past few months she has developed increasing perianal rash and some discomfort. She is also been having worsening constipation. Her last colonoscopy was 3 years ago by Dr. Harsha Lr. Patient's problem list, medications, past medical, surgical, family, and social histories were reviewed and updated in the chart as indicated today. Past Medical History:   Diagnosis Date    [facility] ([facility]     Crohn's disease with complication ([facility] [address] [facility] 78/6/6902    Hypertension     Seasonal allergies        Past Surgical History:   Procedure Laterality Date    ANKLE SURGERY Left     COLONOSCOPY      COLONOSCOPY N/A 10/4/2019    COLONOSCOPY WITH BIOPSY performed by Sharlotte Apley, [place] at [place] [address]       Cancer-related family history is not on file. Social History:   Social History     Tobacco Use    Smoking status: Never Smoker    Smokeless tobacco: Never Used   Substance Use Topics    Alcohol use: Yes     Comment: social      Tobacco cessation counseling provided as appropriate. REVIEW OF SYSTEMS:    Pertinent positives and negatives are mentioned in the HPI above. Otherwise, all other systems were reviewed and negative.       Objective:     Physical Exam   /79   Pulse 95   Temp 97.7 °F (36.5 °C) (Infrared)   Resp 16   Ht 5' 6\" (1.676 m)   Wt 120 lb (54.4 kg)   SpO2 96%   BMI 19.37 kg/m²   Constitutional: Appears well-developed and well-nourished. Grooming appropriate. No gross deformities. Body mass index is 19.37 kg/m². Eyes: No scleral icterus. Conjunctiva/lids normal. Vision intact grossly. Pupils equal/symmetric, reactive bilaterally. ENT: External ears/nose without defect, scars, or masses. Hearing grossly intact. No facial deformity. Lips normal, normal dentition. Neck: No masses. Trachea midline. No crepitus. Thyroid not enlarged. Cardiovascular: Normal rate. No peripheral edema. Abdominal aorta normal size to palpation. Pulmonary/Chest: Effort normal. No respiratory distress. No wheezes. No use of accessory muscles. Musculoskeletal: Normal range of motion x all 4 extremities and head/neck, without deformity, pain, or crepitus, with normal strength and tone. Normal gait. Nails without clubbing or cyanosis. Neurological: Alert and oriented to person, place, and time. No gross deficits. Sensation intact. Skin: Skin is dry. No rashes noted. No pallor. No induration of nodules. Psychiatric: Normal mood and affect. Behavior normal. Oriented to person, place, and time. Judgment and insight reasonable. Abdominal/wound: Soft, nontender, nondistended    Anorectal Exam: Chaperone present in room throughout exam.  Patient placed in the left lateral position. Buttocks spread. Digital rectal exam performed with lubricated finger. Anoscopy performed. Findings reveal: Anal stenosis noted, but able to tolerate digital exam and anoscopy. Inflammation of anal canal noted.   Large, superficial, burning-like rash noted around the perianal skin    Labs reviewed: None  Radiology reviewed: None    Last colonoscopy: 2019, Joo Sher      Assessment/Plan:     A/P:  New problem(s) with uncertain prognosis: Perianal rash of unknown etiology  Established problem(s): Crohn's disease of the colon and ileum  Additional workup/treatment planned: Back to GI for colonoscopy, OTC/conservative management for rash, consider changing biologic  Risk of complications/morbidity: Leah Alcazar has well-controlled Crohn's disease for the past 22 years and is currently on Remicade. She has developed a new rash around the perianal skin. I am not exactly sure what the etiology of this rash is, but I did find some anal stenosis and irritation consistent with Crohn's anorectal disease on digital and anoscopy. I am curious whether her perianal rash represents irritation from stool changes due to her Crohn's. I spoke to Dr. Alphonse Aquino of GI, who will get her back in for repeat colonoscopy and biopsies. We may consider changing to a different biologic to see if this improves. Otherwise, she should continue with Calmoseptine and skin barrier ointment for now. I also discussed with her considering in- office punch biopsy of the skin lesion. Continue with current medications    DISPOSITION:  F/u with GI    My findings will be relayed to consulting practitioner or PCP via Epic    Note completed using dictation software, please excuse any errors.     Electronically signed by Barbi Jacobsen MD on 5/17/2022 at 1:53 PM

## 2022-06-10 NOTE — PROGRESS NOTES
[facility] ENDOSCOPY COLONOSCOPY PRE-OPERATIVE INSTRUCTIONS    Procedure date_06/20/22________  Arrival time__0600__________          Surgery time__0700__________       Clear liquids the day before the procedure. Do not eat or drink anything within 5 hours of your procedure. This includes water chewing gum, mints and ice chips. You may brush your teeth and gargle the morning of your surgery, but do not swallow the water    You may be asked to stop blood thinners such as Coumadin, Plavix, Fragmin, Lovenox, etc., or any anti-inflammatories such as:  Aspirin, Ibuprofen, Advil, Naproxen prior to your procedure. We also ask that you stop any OTC medications such as fish oil, vitamin E, glucosamine, garlic, Multivitamins, COQ 10, etc.    You must make arrangements for a responsible adult to arrive with you and stay in our waiting area during your procedure. They will also need to take you home after your procedure. For your safety you will not be allowed to leave alone or drive yourself home. Also for your safety, it is strongly suggested that someone stay with you the first 24 hours after your procedure. For your comfort, please wear simple loose fitting clothing to the center. Please do not bring valuables. If you have a living will and a durable power of  for healthcare, please bring in a copy.      You will need to bring a photo ID and insurance card    Our goal is to provide you with excellent care so if you have any questions, please contact us at the [facility] at [phone]         Please note these are generalized instructions for all colonoscopy cases, you may be provided with more specific instructions if necessary

## 2022-06-17 ENCOUNTER — ANESTHESIA EVENT (OUTPATIENT)
Dept: ENDOSCOPY | Age: 42
End: 2022-06-17
Payer: COMMERCIAL

## 2022-06-20 ENCOUNTER — ANESTHESIA (OUTPATIENT)
Dept: ENDOSCOPY | Age: 42
End: 2022-06-20
Payer: COMMERCIAL

## 2022-06-20 ENCOUNTER — HOSPITAL ENCOUNTER (OUTPATIENT)
Age: 42
Setting detail: OUTPATIENT SURGERY
Discharge: HOME | End: 2022-06-20
Attending: INTERNAL MEDICINE | Admitting: INTERNAL MEDICINE
Payer: COMMERCIAL

## 2022-06-20 VITALS
SYSTOLIC BLOOD PRESSURE: 101 MMHG | WEIGHT: 120 LBS | OXYGEN SATURATION: 100 % | HEIGHT: 66 IN | BODY MASS INDEX: 19.29 KG/M2 | RESPIRATION RATE: 16 BRPM | DIASTOLIC BLOOD PRESSURE: 78 MMHG | HEART RATE: 87 BPM | TEMPERATURE: 96.6 F

## 2022-06-20 DIAGNOSIS — K50.90 CROHN'S DISEASE WITHOUT COMPLICATION, UNSPECIFIED GASTROINTESTINAL TRACT LOCATION (HCC): ICD-10-CM

## 2022-06-20 LAB — PREGNANCY, URINE: NEGATIVE

## 2022-06-20 PROCEDURE — 6360000002 HC RX W HCPCS: Performed by: NURSE ANESTHETIST, CERTIFIED REGISTERED

## 2022-06-20 PROCEDURE — 84703 CHORIONIC GONADOTROPIN ASSAY: CPT

## 2022-06-20 PROCEDURE — 3700000000 HC ANESTHESIA ATTENDED CARE: Performed by: INTERNAL MEDICINE

## 2022-06-20 PROCEDURE — 2580000003 HC RX 258: Performed by: NURSE ANESTHETIST, CERTIFIED REGISTERED

## 2022-06-20 PROCEDURE — 3609010300 HC COLONOSCOPY W/BIOPSY SINGLE/MULTIPLE: Performed by: INTERNAL MEDICINE

## 2022-06-20 PROCEDURE — 88305 TISSUE EXAM BY PATHOLOGIST: CPT

## 2022-06-20 PROCEDURE — 2709999900 HC NON-CHARGEABLE SUPPLY: Performed by: INTERNAL MEDICINE

## 2022-06-20 PROCEDURE — 2580000003 HC RX 258: Performed by: ANESTHESIOLOGY

## 2022-06-20 PROCEDURE — 3700000001 HC ADD 15 MINUTES (ANESTHESIA): Performed by: INTERNAL MEDICINE

## 2022-06-20 PROCEDURE — 2500000003 HC RX 250 WO HCPCS: Performed by: NURSE ANESTHETIST, CERTIFIED REGISTERED

## 2022-06-20 PROCEDURE — 7100000010 HC PHASE II RECOVERY - FIRST 15 MIN: Performed by: INTERNAL MEDICINE

## 2022-06-20 PROCEDURE — 7100000011 HC PHASE II RECOVERY - ADDTL 15 MIN: Performed by: INTERNAL MEDICINE

## 2022-06-20 RX ORDER — SODIUM CHLORIDE 9 MG/ML
INJECTION, SOLUTION INTRAVENOUS CONTINUOUS PRN
Status: DISCONTINUED | OUTPATIENT
Start: 2022-06-20 | End: 2022-06-20 | Stop reason: SDUPTHER

## 2022-06-20 RX ORDER — DIPHENHYDRAMINE HYDROCHLORIDE 50 MG/ML
12.5 INJECTION INTRAMUSCULAR; INTRAVENOUS
Status: CANCELLED | OUTPATIENT
Start: 2022-06-20 | End: 2022-06-20

## 2022-06-20 RX ORDER — PROPOFOL 10 MG/ML
INJECTION, EMULSION INTRAVENOUS PRN
Status: DISCONTINUED | OUTPATIENT
Start: 2022-06-20 | End: 2022-06-20 | Stop reason: SDUPTHER

## 2022-06-20 RX ORDER — SODIUM CHLORIDE 9 MG/ML
INJECTION, SOLUTION INTRAVENOUS PRN
Status: CANCELLED | OUTPATIENT
Start: 2022-06-20

## 2022-06-20 RX ORDER — SODIUM CHLORIDE 0.9 % (FLUSH) 0.9 %
5-40 SYRINGE (ML) INJECTION PRN
Status: DISCONTINUED | OUTPATIENT
Start: 2022-06-20 | End: 2022-06-20 | Stop reason: HOSPADM

## 2022-06-20 RX ORDER — ONDANSETRON 2 MG/ML
4 INJECTION INTRAMUSCULAR; INTRAVENOUS
Status: CANCELLED | OUTPATIENT
Start: 2022-06-20 | End: 2022-06-20

## 2022-06-20 RX ORDER — LIDOCAINE HYDROCHLORIDE 20 MG/ML
INJECTION, SOLUTION EPIDURAL; INFILTRATION; INTRACAUDAL; PERINEURAL PRN
Status: DISCONTINUED | OUTPATIENT
Start: 2022-06-20 | End: 2022-06-20 | Stop reason: SDUPTHER

## 2022-06-20 RX ORDER — SODIUM CHLORIDE 0.9 % (FLUSH) 0.9 %
5-40 SYRINGE (ML) INJECTION PRN
Status: CANCELLED | OUTPATIENT
Start: 2022-06-20

## 2022-06-20 RX ORDER — OXYCODONE HYDROCHLORIDE 5 MG/1
10 TABLET ORAL PRN
Status: CANCELLED | OUTPATIENT
Start: 2022-06-20 | End: 2022-06-20

## 2022-06-20 RX ORDER — MEPERIDINE HYDROCHLORIDE 25 MG/ML
12.5 INJECTION INTRAMUSCULAR; INTRAVENOUS; SUBCUTANEOUS EVERY 5 MIN PRN
Status: CANCELLED | OUTPATIENT
Start: 2022-06-20

## 2022-06-20 RX ORDER — SODIUM CHLORIDE 0.9 % (FLUSH) 0.9 %
5-40 SYRINGE (ML) INJECTION EVERY 12 HOURS SCHEDULED
Status: CANCELLED | OUTPATIENT
Start: 2022-06-20

## 2022-06-20 RX ORDER — LABETALOL HYDROCHLORIDE 5 MG/ML
5 INJECTION, SOLUTION INTRAVENOUS
Status: CANCELLED | OUTPATIENT
Start: 2022-06-20

## 2022-06-20 RX ORDER — OXYCODONE HYDROCHLORIDE 5 MG/1
5 TABLET ORAL PRN
Status: CANCELLED | OUTPATIENT
Start: 2022-06-20 | End: 2022-06-20

## 2022-06-20 RX ORDER — SODIUM CHLORIDE 9 MG/ML
INJECTION, SOLUTION INTRAVENOUS PRN
Status: DISCONTINUED | OUTPATIENT
Start: 2022-06-20 | End: 2022-06-20 | Stop reason: HOSPADM

## 2022-06-20 RX ORDER — SODIUM CHLORIDE 0.9 % (FLUSH) 0.9 %
5-40 SYRINGE (ML) INJECTION EVERY 12 HOURS SCHEDULED
Status: DISCONTINUED | OUTPATIENT
Start: 2022-06-20 | End: 2022-06-20 | Stop reason: HOSPADM

## 2022-06-20 RX ORDER — FENTANYL CITRATE 50 UG/ML
25 INJECTION, SOLUTION INTRAMUSCULAR; INTRAVENOUS EVERY 5 MIN PRN
Status: CANCELLED | OUTPATIENT
Start: 2022-06-20

## 2022-06-20 RX ADMIN — PROPOFOL 50 MG: 10 INJECTION, EMULSION INTRAVENOUS at 07:22

## 2022-06-20 RX ADMIN — PROPOFOL 50 MG: 10 INJECTION, EMULSION INTRAVENOUS at 07:24

## 2022-06-20 RX ADMIN — SODIUM CHLORIDE: 9 INJECTION, SOLUTION INTRAVENOUS at 07:00

## 2022-06-20 RX ADMIN — PROPOFOL 50 MG: 10 INJECTION, EMULSION INTRAVENOUS at 07:18

## 2022-06-20 RX ADMIN — PROPOFOL 100 MG: 10 INJECTION, EMULSION INTRAVENOUS at 07:13

## 2022-06-20 RX ADMIN — PROPOFOL 50 MG: 10 INJECTION, EMULSION INTRAVENOUS at 07:16

## 2022-06-20 RX ADMIN — SODIUM CHLORIDE: 9 INJECTION, SOLUTION INTRAVENOUS at 06:58

## 2022-06-20 RX ADMIN — LIDOCAINE HYDROCHLORIDE 40 MG: 20 INJECTION, SOLUTION EPIDURAL; INFILTRATION; INTRACAUDAL; PERINEURAL at 07:13

## 2022-06-20 RX ADMIN — PROPOFOL 50 MG: 10 INJECTION, EMULSION INTRAVENOUS at 07:20

## 2022-06-20 ASSESSMENT — ENCOUNTER SYMPTOMS: SHORTNESS OF BREATH: 0

## 2022-06-20 ASSESSMENT — PAIN - FUNCTIONAL ASSESSMENT: PAIN_FUNCTIONAL_ASSESSMENT: NONE - DENIES PAIN

## 2022-06-20 ASSESSMENT — LIFESTYLE VARIABLES: SMOKING_STATUS: 0

## 2022-06-20 NOTE — H&P
[facility]   Pre-operative History and Physical    Patient: Mehran Mckeon  : 1980  Acct#: [de-identified]    History Obtained From: electronic medical record    HISTORY OF PRESENT ILLNESS  Procedure:Colonoscopy  Indications:Crohn's  Past Medical History:        Diagnosis Date    Cancer [facility])     DCIS- stage 0    Cro[facility]'s colitis ([facility] [address] [facility]     [facility]'s disease with complication ([facility]     Hypertension     Seasonal allergies      Past Surgical History:        Procedure Laterality Date    ANKLE SURGERY Left     BREAST SURGERY      lumpectomy    COLONOSCOPY      COLONOSCOPY N/A 10/4/2019    COLONOSCOPY WITH BIOPSY performed by Ryland Solis MD at [facility] ENDOSCOPY     Medications prior to admission:   Prior to Admission medications    Medication Sig Start Date End Date Taking?  Authorizing Provider   venlafaxine (EFFEXOR XR) 37.5 MG extended release capsule Take 37.5 mg by mouth daily    Historical Provider, MD   tamoxifen (NOLVADEX) 20 MG tablet Take 20 mg by mouth daily    Historical Provider, MD   polyethylene glycol (MIRALAX) 17 g packet Take 17 g by mouth daily as needed for Constipation    Historical Provider, MD   lisinopril (PRINIVIL;ZESTRIL) 20 MG tablet Take 20 mg by mouth daily    Historical Provider, MD   Fexofenadine HCl (ALLEGRA ALLERGY PO) Take by mouth    Historical Provider, MD   budesonide (RHINOCORT ALLERGY) 32 MCG/ACT nasal spray 1 spray by Each Nostril route daily    Historical Provider, MD   Adalimumab 40 MG/0.4ML PNKT Inject into the skin Indications: weekly     Historical Provider, MD     Allergies:   Escitalopram oxalate and Infliximab    Social History     Socioeconomic History    Marital status: Single     Spouse name: Not on file    Number of children: Not on file    Years of education: Not on file    Highest education level: Not on file   Occupational History    Not on file   Tobacco Use    Smoking status: Never Smoker    Smokeless tobacco: Never Used Substance and Sexual Activity    Alcohol use: Yes     Comment: social    Drug use: Never    Sexual activity: Not on file   Other Topics Concern    Not on file   Social History Narrative    Not on file     Social Determinants of Health     Financial Resource Strain:     Difficulty of Paying Living Expenses: Not on file   Food Insecurity:     Worried About Running Out of Food in the Last Year: Not on file    Jose of Food in the Last Year: Not on file   Transportation Needs:     Lack of Transportation (Medical): Not on file    Lack of Transportation (Non-Medical): Not on file   Physical Activity:     Days of Exercise per Week: Not on file    Minutes of Exercise per Session: Not on file   Stress:     Feeling of Stress : Not on file   Social Connections:     Frequency of Communication with Friends and Family: Not on file    Frequency of Social Gatherings with Friends and Family: Not on file    Attends Caodaism Services: Not on file    Active Member of [address] [place] [address] or Organizations: Not on file    Attends Club or Organization Meetings: Not on file    Marital Status: Not on file   Intimate Partner Violence:     Fear of Current or Ex-Partner: Not on file    Emotionally Abused: Not on file    Physically Abused: Not on file    Sexually Abused: Not on file   Housing Stability:     Unable to Pay for Housing in the Last Year: Not on file    Number of [place] in the Last Year: Not on file    Unstable Housing in the Last Year: Not on file     Family History   Problem Relation Age of Onset    Alzheimer's Disease Mother     Other Father         a Fib         PHYSICAL EXAM:      /75   Pulse 91   Temp 97.2 °F (36.2 °C) (Bladder)   Resp 16   Ht 5' 6\" (1.676 m)   Wt 120 lb (54.4 kg)   LMP 05/24/2022 (Approximate)   SpO2 99%   BMI 19.37 kg/m²  I        Heart:normal    Lungs: normal    Abdomen: normal      ASA Grade:  See anesthesia note      ASSESSMENT AND PLAN:    1.   Procedure options,

## 2022-06-20 NOTE — ANESTHESIA PRE PROCEDURE
Department of Anesthesiology  Preprocedure Note       Name:  Joan Goldstein   Age:  43 y.o.  :  1980                                          MRN:  [id number]         Date:  2022      Surgeon: Wiliam Pope):  Sharlotte Apley, MD    Procedure: Procedure(s):  COLONOSCOPY DIAGNOSTIC    Medications prior to admission:   Prior to Admission medications    Medication Sig Start Date End Date Taking? Authorizing Provider   venlafaxine (EFFEXOR XR) 37.5 MG extended release capsule Take 37.5 mg by mouth daily    Historical Provider, MD   tamoxifen (NOLVADEX) 20 MG tablet Take 20 mg by mouth daily    Historical Provider, MD   polyethylene glycol (MIRALAX) 17 g packet Take 17 g by mouth daily as needed for Constipation    Historical Provider, MD   lisinopril (PRINIVIL;ZESTRIL) 20 MG tablet Take 20 mg by mouth daily    Historical Provider, MD   Fexofenadine HCl (ALLEGRA ALLERGY PO) Take by mouth    Historical Provider, MD   budesonide (RHINOCORT ALLERGY) 32 MCG/ACT nasal spray 1 spray by Each Nostril route daily    Historical Provider, MD   Adalimumab 40 MG/0.4ML PNKT Inject into the skin Indications: weekly     Historical Provider, MD       Current medications:    Current Facility-Administered Medications   Medication Dose Route Frequency Provider Last Rate Last Admin    sodium chloride flush 0.9 % injection 5-40 mL  5-40 mL IntraVENous 2 times per day Carmel Hilton MD        sodium chloride flush 0.9 % injection 5-40 mL  5-40 mL IntraVENous PRN Carmel Hilton MD        0.9 % sodium chloride infusion   IntraVENous PRN Carmel Hilton MD           Allergies:     Allergies   Allergen Reactions    Escitalopram Oxalate     Infliximab        Problem List:    Patient Active Problem List   Diagnosis Code    [facility] E71.969       Past Medical History:        Diagnosis Date    Cancer ([facility]     DCIS- stage 0    [facility]'s colitis ([facility]     [facility]'s disease [facility]     Hypertension     Seasonal allergies        Past Surgical History:        Procedure Laterality Date    ANKLE SURGERY Left     BREAST SURGERY      lumpectomy    COLONOSCOPY      COLONOSCOPY N/A 10/4/2019    COLONOSCOPY WITH BIOPSY performed by Danielle Amaya MD at [address] [place] [address] History:    Social History     Tobacco Use    Smoking status: Never Smoker    Smokeless tobacco: Never Used   Substance Use Topics    Alcohol use: Yes     Comment: social                                Counseling given: Not Answered      Vital Signs (Current):   Vitals:    06/10/22 1451   Weight: 120 lb (54.4 kg)   Height: 5' 6\" (1.676 m)                                              BP Readings from Last 3 Encounters:   05/17/22 122/79   10/04/19 114/76   10/04/19 (!) 114/90       NPO Status: Time of last liquid consumption: 2200                        Time of last solid consumption: 2100                        Date of last liquid consumption: 06/19/22                        Date of last solid food consumption: 06/18/22    BMI:   Wt Readings from Last 3 Encounters:   06/10/22 120 lb (54.4 kg)   05/17/22 120 lb (54.4 kg)   10/04/19 129 lb 12.8 oz (58.9 kg)     Body mass index is 19.37 kg/m².     CBC:   Lab Results   Component Value Date    WBC 8.9 12/06/2021    RBC 4.05 12/06/2021    HGB 12.7 12/06/2021    HCT 38.3 12/06/2021    MCV 94.5 12/06/2021    RDW 12.2 12/06/2021     12/06/2021       CMP:   Lab Results   Component Value Date     12/06/2021    K 4.2 12/06/2021    CL 99 12/06/2021    CO2 26 12/06/2021    BUN 18 12/06/2021    CREATININE 0.7 12/06/2021    GFRAA >60 12/06/2021    AGRATIO 1.0 12/06/2021    LABGLOM >60 12/06/2021    GLUCOSE 94 12/06/2021    PROT 8.1 12/06/2021    CALCIUM 9.3 12/06/2021    BILITOT 0.3 12/06/2021    ALKPHOS 63 12/06/2021    AST 23 12/06/2021    ALT 7 12/06/2021       POC Tests: No results for input(s): POCGLU, POCNA, POCK, POCCL, POCBUN, POCHEMO, POCHCT in the last 72 hours.    Coags: No results found for: PROTIME, INR, APTT    HCG (If Applicable):   Lab Results   Component Value Date    PREGTESTUR Negative 10/04/2019        ABGs: No results found for: PHART, PO2ART, TQF7PJG, NTI3XZG, BEART, I3PVMCBJ     Type & Screen (If Applicable):  No results found for: LABABO, LABRH    Drug/Infectious Status (If Applicable):  No results found for: HIV, HEPCAB    COVID-19 Screening (If Applicable): No results found for: COVID19        Anesthesia Evaluation  Patient summary reviewed no history of anesthetic complications:   Airway: Mallampati: II  TM distance: >3 FB   Neck ROM: full  Mouth opening: > = 3 FB   Dental: normal exam         Pulmonary:normal exam  breath sounds clear to auscultation      (-) shortness of breath and not a current smoker                          ROS comment: Seasonal allergies   Cardiovascular:Negative CV ROS  Exercise tolerance: good (>4 METS),   (+) hypertension:,         Rhythm: regular  Rate: normal                    Neuro/Psych:   Negative Neuro/Psych ROS              GI/Hepatic/Renal:            ROS comment: CHrohn's. Endo/Other:    (+) malignancy/cancer. ROS comment: Breast DCIS stage 0 Abdominal:             Vascular: negative vascular ROS. Other Findings:           Anesthesia Plan      MAC     ASA 2       Induction: intravenous. Anesthetic plan and risks discussed with patient. Plan discussed with CRNA.     Attending anesthesiologist reviewed and agrees with Guerline Conley MD   6/20/2022

## 2022-06-20 NOTE — PROCEDURES
[facility]  Endoscopy Note    Patient: Mehran Mckeon  : 1980  Acct#: [de-identified]    Procedure: Colonoscopy with biopsy    Date:  2022    Surgeon:  Ryland Solis MD, MD    Referring Physician:  Alvin Fam    Previous Colonoscopy: Yes  Date: 10/4/19  Greater than 3 years? No    Preoperative Diagnosis:  Crohn's    Postoperative Diagnosis:  Crohn's with inflammatory lesions in the descending colon. I could not advance to the cecum due to stricturing of the colon. Anesthesia:  See anesthesia note    Indications: This is a 43y.o. year old female who presents today with Crohn's disease. Procedure: An informed consent was obtained from the patient after explanation of indications, benefits, possible risks and complications of the procedure. The patient was then taken to the endoscopy suite, placed in the left lateral decubitus position, and the above IV anesthesia was administered. A digital rectal examination was performed and revealed negative without mass, lesions or tenderness. The Olympus CFQ-180-AL video colonoscope was placed in the patient's rectum under digital direction and advanced to the ascending colon. The preparation was fair. The scope was then withdrawn back through the ascending, transverse, descending and sigmoid colons. Carefull circumferential examination of the mucosa in these areas demonstrated inflammatory lesions of the colon which were biopsied in the descending colon. I could not advance to the cecum due to stricturing of the colon. The scope was then withdrawn into the rectum and retroflexed. The retroflexed view of the anal verge and rectum demonstrates no abnormalities. The scope was straightened, the colon was decompressed and the scope was withdrawn from the patient. The patient tolerated the procedure well and was taken to the PACU in good condition.     Estimated Blood Loss:  Minimal.    Impression:  Crohn's lesions in the colon with stricturing. Recommendations:  Await pathology. Repeat colonoscopy in 3 years.  Will get CT of the abdomen and pelvis with oral and rectal contrast.    Randene Soulier, MD, MD [facility]  6/20/2022

## 2022-06-28 ENCOUNTER — OFFICE VISIT (OUTPATIENT)
Dept: SURGERY | Age: 42
End: 2022-06-28
Payer: COMMERCIAL

## 2022-06-28 VITALS
SYSTOLIC BLOOD PRESSURE: 121 MMHG | WEIGHT: 121 LBS | BODY MASS INDEX: 19.44 KG/M2 | HEIGHT: 66 IN | DIASTOLIC BLOOD PRESSURE: 83 MMHG | HEART RATE: 91 BPM | TEMPERATURE: 97.4 F | OXYGEN SATURATION: 100 %

## 2022-06-28 DIAGNOSIS — K50.80 CROHN'S DISEASE OF BOTH SMALL AND LARGE INTESTINE WITHOUT COMPLICATION (HCC): Primary | ICD-10-CM

## 2022-06-28 DIAGNOSIS — R21 PERIANAL RASH: ICD-10-CM

## 2022-06-28 PROCEDURE — 99214 OFFICE O/P EST MOD 30 MIN: CPT | Performed by: SURGERY

## 2022-06-28 NOTE — PROGRESS NOTES
[facility]  [address]  Dept: [phone]  Dept Fax: [phone]  Loc: [phone]    Visit Date: 6/28/2022    Pauly Anaya is a 43 y.o. female who presents today for: Follow-up (Crohn's disease )      HPI:       Pauly Anaya is a 43 y.o. female known to me for recent visit for Crohn's disease of the perianal region, as well as anal stricture and ileocolonic proctocolitis. She is here today for follow-up after recent colonoscopy with Dr. Oralia Arambula. Colonoscopy did show a stricture in the descending colon that was unable to be traversed with the colonoscope. Biopsies were obtained. The remainder of the colon also showed changes consistent with Crohn's disease.     Past Medical History:   Diagnosis Date    Cancer [facility])     DCIS- stage 0    [facility] ([facility]     [facility] 39/9/1373    Hypertension     Seasonal allergies      Past Surgical History:   Procedure Laterality Date    ANKLE SURGERY Left     BREAST SURGERY      lumpectomy    COLONOSCOPY      COLONOSCOPY N/A 10/4/2019    COLONOSCOPY WITH BIOPSY performed by Danielle Scales MD at [address]  COLONOSCOPY N/A 6/20/2022    COLONOSCOPY WITH BIOPSY performed by Danielle Scaels MD at [facility] ENDOSCOPY       Current Outpatient Medications:     venlafaxine (EFFEXOR XR) 37.5 MG extended release capsule, Take 37.5 mg by mouth daily, Disp: , Rfl:     tamoxifen (NOLVADEX) 20 MG tablet, Take 20 mg by mouth daily, Disp: , Rfl:     polyethylene glycol (MIRALAX) 17 g packet, Take 17 g by mouth daily as needed for Constipation, Disp: , Rfl:     lisinopril (PRINIVIL;ZESTRIL) 20 MG tablet, Take 20 mg by mouth daily, Disp: , Rfl:     Fexofenadine HCl (ALLEGRA ALLERGY PO), Take by mouth, Disp: , Rfl:     budesonide (RHINOCORT ALLERGY) 32 MCG/ACT nasal spray, 1 spray by Each Nostril route daily, Disp: , Rfl:     Adalimumab 40 MG/0.4ML PNKT, Inject into the skin Indications: weekly , Disp: , Rfl:   Allergies   Allergen Reactions    Escitalopram Oxalate     Infliximab      Past Surgical History:   Procedure Laterality Date    ANKLE SURGERY Left     BREAST SURGERY      lumpectomy    COLONOSCOPY      COLONOSCOPY N/A 10/4/2019    COLONOSCOPY WITH BIOPSY performed by Rosalie Hernandez MD at [address] COLONOSCOPY N/A 6/20/2022    COLONOSCOPY WITH BIOPSY [facility]     Family History   Problem Relation Age of Onset    Alzheimer's Disease Mother     Other Father         a Fib       Social History:   Social History     Tobacco Use    Smoking status: Never Smoker    Smokeless tobacco: Never Used   Substance Use Topics    Alcohol use: Yes     Comment: social      Tobacco cessation counseling provided as appropriate. REVIEW OF SYSTEMS:    Pertinent positives and negatives are mentioned in the HPI. Otherwise, all other systems were reviewed and negative. Objective:     Physical Exam   /83   Pulse 91   Temp 97.4 °F (36.3 °C) (Infrared)   Ht 5' 6\" (1.676 m)   Wt 121 lb (54.9 kg)   SpO2 100%   BMI 19.53 kg/m²   Constitutional: Appears well-developed and well-nourished. Grooming appropriate. No gross deformities. Body mass index is 19.53 kg/m². Eyes: No scleral icterus. Conjunctiva/lids normal. Vision intact grossly. Pupils equal/symmetric, reactive bilaterally. ENT: External ears/nose without defect, scars, or masses. Hearing grossly intact. No facial deformity. Lips normal, normal dentition. Neck: No masses. Trachea midline. No crepitus. Thyroid not enlarged. Cardiovascular: Normal rate. No peripheral edema. Abdominal aorta normal size to palpation. Pulmonary/Chest: Effort normal. No respiratory distress. No wheezes. No use of accessory muscles.   Musculoskeletal: Normal range of motion of head/neck, without deformity, pain, or crepitus, with via Epic note    Note completed using dictation software, please excuse any errors.     Electronically signed by Donald Zelaya MD on 6/28/2022 at 1:42 PM

## 2022-07-09 LAB
A/G RATIO: 1.6 (ref 1.1–2.2)
ALBUMIN SERPL-MCNC: 4.3 G/DL (ref 3.4–5)
ALP BLD-CCNC: 51 U/L (ref 40–129)
ALT SERPL-CCNC: 10 U/L (ref 10–40)
ANION GAP SERPL CALCULATED.3IONS-SCNC: 8 MMOL/L (ref 3–16)
AST SERPL-CCNC: 21 U/L (ref 15–37)
BASOPHILS ABSOLUTE: 0 K/UL (ref 0–0.2)
BASOPHILS RELATIVE PERCENT: 0.4 %
BILIRUB SERPL-MCNC: 0.5 MG/DL (ref 0–1)
BUN BLDV-MCNC: 13 MG/DL (ref 7–20)
CALCIUM SERPL-MCNC: 9.5 MG/DL (ref 8.3–10.6)
CHLORIDE BLD-SCNC: 103 MMOL/L (ref 99–110)
CO2: 28 MMOL/L (ref 21–32)
CREAT SERPL-MCNC: 0.6 MG/DL (ref 0.6–1.1)
EOSINOPHILS ABSOLUTE: 0 K/UL (ref 0–0.6)
EOSINOPHILS RELATIVE PERCENT: 0.8 %
GFR AFRICAN AMERICAN: >60
GFR NON-AFRICAN AMERICAN: >60
GLUCOSE BLD-MCNC: 90 MG/DL (ref 70–99)
HCT VFR BLD CALC: 35.5 % (ref 36–48)
HEMOGLOBIN: 12 G/DL (ref 12–16)
LYMPHOCYTES ABSOLUTE: 1.9 K/UL (ref 1–5.1)
LYMPHOCYTES RELATIVE PERCENT: 39.4 %
MCH RBC QN AUTO: 32 PG (ref 26–34)
MCHC RBC AUTO-ENTMCNC: 33.9 G/DL (ref 31–36)
MCV RBC AUTO: 94.2 FL (ref 80–100)
MONOCYTES ABSOLUTE: 0.3 K/UL (ref 0–1.3)
MONOCYTES RELATIVE PERCENT: 6.2 %
NEUTROPHILS ABSOLUTE: 2.5 K/UL (ref 1.7–7.7)
NEUTROPHILS RELATIVE PERCENT: 53.2 %
PDW BLD-RTO: 12.4 % (ref 12.4–15.4)
PLATELET # BLD: 277 K/UL (ref 135–450)
PMV BLD AUTO: 7.1 FL (ref 5–10.5)
POTASSIUM SERPL-SCNC: 4.6 MMOL/L (ref 3.5–5.1)
RBC # BLD: 3.76 M/UL (ref 4–5.2)
SODIUM BLD-SCNC: 139 MMOL/L (ref 136–145)
TOTAL PROTEIN: 7 G/DL (ref 6.4–8.2)
WBC # BLD: 4.8 K/UL (ref 4–11)

## 2022-07-12 LAB — MISCELLANEOUS LAB TEST ORDER: NORMAL

## 2022-07-15 ENCOUNTER — HOSPITAL ENCOUNTER (OUTPATIENT)
Dept: CT IMAGING | Age: 42
Discharge: HOME | End: 2022-07-15
Payer: COMMERCIAL

## 2022-07-15 DIAGNOSIS — K50.90 CROHN'S DISEASE WITHOUT COMPLICATION, UNSPECIFIED GASTROINTESTINAL TRACT LOCATION (HCC): ICD-10-CM

## 2022-07-15 PROCEDURE — 6360000004 HC RX CONTRAST MEDICATION: Performed by: FAMILY MEDICINE

## 2022-07-15 PROCEDURE — 74177 CT ABD & PELVIS W/CONTRAST: CPT

## 2022-07-15 PROCEDURE — 2500000003 HC RX 250 WO HCPCS: Performed by: FAMILY MEDICINE

## 2022-07-15 RX ADMIN — IOPAMIDOL 75 ML: 755 INJECTION, SOLUTION INTRAVENOUS at 12:33

## 2022-07-15 RX ADMIN — BARIUM SULFATE 450 ML: 1 SUSPENSION ORAL at 12:33

## (undated) DEVICE — FORCEPS BX 240CM 2.4MM L NDL RAD JAW 4 M00513334